# Patient Record
Sex: FEMALE | Race: WHITE | NOT HISPANIC OR LATINO | Employment: UNEMPLOYED | URBAN - METROPOLITAN AREA
[De-identification: names, ages, dates, MRNs, and addresses within clinical notes are randomized per-mention and may not be internally consistent; named-entity substitution may affect disease eponyms.]

---

## 2021-07-17 ENCOUNTER — OFFICE VISIT (OUTPATIENT)
Dept: URGENT CARE | Age: 21
End: 2021-07-17
Payer: COMMERCIAL

## 2021-07-17 VITALS
RESPIRATION RATE: 20 BRPM | TEMPERATURE: 98.4 F | HEART RATE: 90 BPM | OXYGEN SATURATION: 100 % | SYSTOLIC BLOOD PRESSURE: 128 MMHG | DIASTOLIC BLOOD PRESSURE: 77 MMHG

## 2021-07-17 DIAGNOSIS — R10.9 FLANK PAIN: Primary | ICD-10-CM

## 2021-07-17 LAB
SL AMB  POCT GLUCOSE, UA: NEGATIVE
SL AMB LEUKOCYTE ESTERASE,UA: NEGATIVE
SL AMB POCT BILIRUBIN,UA: NEGATIVE
SL AMB POCT BLOOD,UA: ABNORMAL
SL AMB POCT CLARITY,UA: ABNORMAL
SL AMB POCT COLOR,UA: YELLOW
SL AMB POCT KETONES,UA: 80
SL AMB POCT NITRITE,UA: NEGATIVE
SL AMB POCT PH,UA: 5
SL AMB POCT SPECIFIC GRAVITY,UA: 1.03
SL AMB POCT URINE PROTEIN: 30
SL AMB POCT UROBILINOGEN: 0.2

## 2021-07-17 PROCEDURE — 87086 URINE CULTURE/COLONY COUNT: CPT | Performed by: NURSE PRACTITIONER

## 2021-07-17 PROCEDURE — 99283 EMERGENCY DEPT VISIT LOW MDM: CPT | Performed by: NURSE PRACTITIONER

## 2021-07-17 PROCEDURE — G0382 LEV 3 HOSP TYPE B ED VISIT: HCPCS | Performed by: NURSE PRACTITIONER

## 2021-07-17 PROCEDURE — 81002 URINALYSIS NONAUTO W/O SCOPE: CPT | Performed by: NURSE PRACTITIONER

## 2021-07-17 RX ORDER — TACROLIMUS 1 MG/1
2 CAPSULE ORAL
COMMUNITY
Start: 2021-05-12

## 2021-07-17 NOTE — PROGRESS NOTES
[facility]        NAME: Jaclyn Hopson is a 24 y o  female  : 2000    MRN: [id number]  DATE: 2021  TIME: 6:27 PM    Assessment and Plan   Flank pain [R10 9]  1  Flank pain  POCT urine dip    Urine culture         Patient Instructions     Urine dip is positive for blood  Advised to go to the ED for further eval, to r/o kidney stones or other causes    Chief Complaint     Chief Complaint   Patient presents with    Abdominal Pain     pt states started with pain in right flank this am, pain radiates around to abdomen toward groin, called transplant doctor and was told to r/o a UTI         History of Present Illness       HPI    presents to clinic with complaint of abdominal pain  States it started this morning  Sometimes the pain radiates from the right flank today front of the abdomen down into the groin  Other times radiates from around the umbilicus and goes to the right flank  She has a history of  Liver transplant  No fever  No nausea or vomiting  Review of Systems   Review of Systems   Constitutional: Negative for chills and fever  HENT: Negative for sore throat and trouble swallowing  Respiratory: Negative for cough, shortness of breath and wheezing  Cardiovascular: Negative for chest pain  Gastrointestinal: Positive for abdominal pain  Negative for blood in stool, diarrhea, nausea and vomiting  Genitourinary: Negative for difficulty urinating and frequency  Neurological: Negative for dizziness and headaches           Current Medications       Current Outpatient Medications:     tacrolimus (PROGRAF) 1 mg capsule, Take 2 mg by mouth, Disp: , Rfl:     Current Allergies     Allergies as of 2021    (No Known Allergies)            The following portions of the patient's history were reviewed and updated as appropriate: allergies, current medications, past family history, past medical history, past social history, past surgical history and problem list  History reviewed  No pertinent past medical history  Past Surgical History:   Procedure Laterality Date    LIVER SURGERY      transplant       History reviewed  No pertinent family history  Medications have been verified  Objective   /77   Pulse 90   Temp 98 4 °F (36 9 °C)   Resp 20   LMP 06/23/2021 (Exact Date)   SpO2 100%   Patient's last menstrual period was 06/23/2021 (exact date)  Physical Exam     Physical Exam  Abdominal:      Tenderness: There is abdominal tenderness in the periumbilical area  There is no guarding  Negative signs include Barrera's sign  Hernia: There is no hernia in the umbilical area or ventral area  Neurological:      Mental Status: She is alert

## 2021-07-19 LAB — BACTERIA UR CULT: NORMAL

## 2022-09-06 ENCOUNTER — TELEPHONE (OUTPATIENT)
Dept: GASTROENTEROLOGY | Facility: CLINIC | Age: 22
End: 2022-09-06

## 2022-09-06 NOTE — TELEPHONE ENCOUNTER
Called patient re her missed 9/6 appointment with Dr Sarahi MAYSM to call so we can reschedule her appointment  Also LVM for her mother, Aparna Chaudhari, to call and reschedule daughter's appointment

## 2022-09-16 ENCOUNTER — TELEPHONE (OUTPATIENT)
Dept: GASTROENTEROLOGY | Facility: CLINIC | Age: 22
End: 2022-09-16

## 2022-09-16 NOTE — TELEPHONE ENCOUNTER
Called patient, spoke to her mother, Denise Wray  They got lost coming to the ov   They rescheduled appointment to 10/11 at 1:00 PM at [address]

## 2022-10-11 ENCOUNTER — OFFICE VISIT (OUTPATIENT)
Dept: GASTROENTEROLOGY | Facility: CLINIC | Age: 22
End: 2022-10-11
Payer: COMMERCIAL

## 2022-10-11 VITALS
SYSTOLIC BLOOD PRESSURE: 122 MMHG | DIASTOLIC BLOOD PRESSURE: 88 MMHG | HEART RATE: 84 BPM | TEMPERATURE: 98.7 F | HEIGHT: 60 IN | BODY MASS INDEX: 25.32 KG/M2 | OXYGEN SATURATION: 99 % | WEIGHT: 129 LBS

## 2022-10-11 DIAGNOSIS — Z94.4 S/P LIVER TRANSPLANT (HCC): Primary | ICD-10-CM

## 2022-10-11 DIAGNOSIS — Z00.00 ROUTINE HEALTH MAINTENANCE: ICD-10-CM

## 2022-10-11 DIAGNOSIS — Z91.89 AT RISK FOR OSTEOPOROSIS: ICD-10-CM

## 2022-10-11 PROCEDURE — 99214 OFFICE O/P EST MOD 30 MIN: CPT | Performed by: STUDENT IN AN ORGANIZED HEALTH CARE EDUCATION/TRAINING PROGRAM

## 2022-10-11 RX ORDER — CLOBETASOL PROPIONATE 0.5 MG/G
OINTMENT TOPICAL
COMMUNITY
Start: 2022-09-20

## 2022-10-11 RX ORDER — CALCIPOTRIENE 0.05 MG/ML
SOLUTION TOPICAL
COMMUNITY
Start: 2022-09-20

## 2022-10-11 NOTE — PROGRESS NOTES
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) with initial post-operative course c/b PVT, hyperammonemia and poor growth  She had a mesocaval shunt performed after her first transplant but required retransplant in 2005 with Rosales-en-Y anastomosis for hyperammonemia and progressive malnutrition  Course has been complicated by hypertension and mild ACR which did not require IV steroids  Of note, her last liver biopsy was in in August 2019 which did not show rejection or fibrosis  She reports intermittent RUQ pain due to adhesive disease  She does have a history of abdominal migraines but has had any symptoms for several years  She is in her third year of college  She denies excessive EtOH consumption or tobacco abuse  Current immunosuppression: tacrolius 2 mg BID; has trialed rapamune in the past     Immunology/Viral Surveillance: CMV R-/D-, EBV R-/ D+ (#1); CMV R-/D-, EBV R-/D- (#2); CMV last positive in 2005, CMV and EBV have been negative  Complication History:  Rejection: Y  Biliary: N  Hepatic Artery: N  Infections: Y  Metabolic Complications:  Immunosuppression Side Effects: N    Review of Systems  Her review of systems is otherwise negative today  She denies bouts of fever, malaise, sweats, headaches, chest pain, dyspnea, cough, weight change, anorexia, nausea, vomiting, abdominal pain, change in bowel movements, gastrointestinal bleeding, jaundice, rash, pruritus, tremors    I have reviewed the past medical, social, family history and medication / allergy list in the chart today  I have made appropriate updates to EMR where indicated      Medications:  Current Outpatient Medications on File Prior to Visit   Medication Sig Dispense Refill   • calcipotriene (DOVONEX) 0 005 % topical solution Apply 1-2 drops to psoriasis areas nightly as needed  • clobetasol (TEMOVATE) 0 05 % ointment apply A THN LAYER TO PINK SCALY PATCHES TWICE DAILY FOR 2 TO 7 DA     (REFER TO PRESCRIPTION NOTES)  • tacrolimus (PROGRAF) 1 mg capsule Take 2 mg by mouth       No current facility-administered medications on file prior to visit  Physical Exam  /88 (BP Location: Left arm, Patient Position: Sitting, Cuff Size: Standard)   Pulse 84   Temp 98 7 °F (37 1 °C) (Tympanic)   Ht 5' (1 524 m)   Wt 58 5 kg (129 lb)   SpO2 99%   BMI 25 19 kg/m²   General: Well-appearing  HEENT: No scleral icterus  Normal oral mucosa  CV: Regular rate and rhythm  Respiratory: Breathing comfortably on room air  GI: Abdomen soft, non-tender, non-distended  No hepatosplenomegaly or ascites  Extremities: Warm, well-perfused  No clubbing, cyanosis, or edema  Skin: No rashes, lesions, or stigmata of chronic liver disease  Psych: Normal mood and affect  Neuro: Alert and oriented, answers questions appropriately  Labs and Imaging  6 6 > 13 9 < 261  140/3 9/103/28/10/0 5<89  Tb 0 8, Db 0 3  AST 17, ALT 17, , GGT 10  Tacrolimus 5 2     Liver biopsy (surveillance 8 5yrs) - 4/18/2014  Focal, (one portal tract) portal inflammation   MARIEL HARSH is negative  --- No evidence of bile duct destruction, cholestasis or bile duct proliferation confirmed with CK7 immunohistochemical stain  --- Scattered, Zone 1, hepatocellular nuclear glycogen  --- No evidence of hepatocellular necrosis or lobulitis  Final Diagnosis, Comment, and Descriptions as rendered by Orquidea Eid MD, [facility], [facility]  AND [facility] Department of Pathology (see also original [facility] Consultation report [id number]): DIAGNOSIS: LIVER TRANSPLANT (BIOPSY; [address] [id number]): UNREMARKABLE HEPATIC PARENCHYMA   NO EVIDENCE OF ACUTE OR CHRONIC REJECTION  Clinical History: S/P LIVER TRANSPLANT X2 (MOST RECENT 2005) FOR PORTAL VEIN THROMBOSIS      Liver biopsy - 8/8/2019 (14 year surveillance)  Final Pathologic Diagnosis   Allograft liver, needle biopsy:   -     Focal non-specific chronic portal inflammation involving few portal areas  -     Trichrome stain shows no significant fibrosis  Diagnosis Comment   The histologic changes are similar to those present in the previous biopsy specimen ([id number]), which was also reviewed  There is no definitive evidence of T-cell mediated rejection  There is no increased fibrosis on trichrome stain  Assessment / Plan / Discussion:  22year-old female with history of BA s/p OLTx 2 (2002 and 2005) with initial post-operative course c/b PVT, hyperammonemia and poor growth requiring re-transplant with Lary anastomosis  Course has been complicated by HTN and mild ACR which have resolved  Her last liver biopsy was in August 2019 which did not show rejection or fibrosis  Her labs were reviewed which showed excellent graft function  Active issues discussed today included immunosuppression monitoring and routine health-care maintenance post-liver transplant  • Immunosuppression  - Continue tacrolimus 2 mg BID  I have recommended laboratories every 3 months  in order to monitor immunosuppression efficacy and toxicities  Standing lab orders placed by me will be updated based on continually review of those lab results  • Renal function  - Wean tacrolimus as able  - Avoid nephrotoxic agents     • Skin Cancer Surveillance  - I discussed the increased risk of de gala malignancy post liver transplant including risk of skin cancer development  Thus, I have recommended a dermatology evaluation once yearly as well as daily use of sunscreen (SPF 25+) and protective clothing      • PAP Surveillance  - Due for PAP for cervical cancer surveillance     • Bone Density Surveillance  - Ordered BMD and recommend vitamin D/calcium supplementation    • Primary Care Monitoring  Dyslipidemia:  Lipid panel ordered  Hypertension: normotensive  Diabetes: A1c ordered   Weight management:   Wt Readings from Last 3 Encounters:   10/11/22 58 5 kg (129 lb)     I have recommended that the she  follow-up with primary care physician on a regular basis for management of other medical issues and preventive care  • Vaccinations  Beyond post-transplant month six, I recommend remaining current with influenza, pneumococcus, and Td/DTaP vaccines  Live vaccinations (for example Varicella) are contraindicated and should NOT be administered yet Shingrix is acceptable for a post transplant recipient       CLINICAL INFORMATION:  Immunosuppression: compliant   New signs or symptoms of CAD?: N  Clinical events related to CAD: N  Post-transplant diabetes: N  Graft Functioning: Yes  Acute Rejection:  N  Post-Transplant Malignancy: N    PHYSICAL STATUS:  Karnofsky Score: 100%  Physical Capacity: no restrictions   Working for Income: student     Return to clinic:  1 year     Stephanie Barrera  Division of Gastroenterology and Hepatology  [address] [address]

## 2022-10-11 NOTE — PATIENT INSTRUCTIONS
It was great meeting you today  Here is a summary of our recommendations from today's visit  Repeat labs this month   Schedule dermatology appointment  Schedule PAP smear with PCP    Pt will schedule Dexa scan

## 2022-11-06 ENCOUNTER — OFFICE VISIT (OUTPATIENT)
Dept: URGENT CARE | Facility: MEDICAL CENTER | Age: 22
End: 2022-11-06

## 2022-11-06 VITALS
TEMPERATURE: 98 F | SYSTOLIC BLOOD PRESSURE: 134 MMHG | HEIGHT: 60 IN | HEART RATE: 100 BPM | RESPIRATION RATE: 18 BRPM | OXYGEN SATURATION: 99 % | BODY MASS INDEX: 25.32 KG/M2 | WEIGHT: 129 LBS | DIASTOLIC BLOOD PRESSURE: 67 MMHG

## 2022-11-06 DIAGNOSIS — R68.83 CHILLS: ICD-10-CM

## 2022-11-06 DIAGNOSIS — R11.0 NAUSEA: ICD-10-CM

## 2022-11-06 DIAGNOSIS — R10.9 ABDOMINAL PAIN, UNSPECIFIED ABDOMINAL LOCATION: Primary | ICD-10-CM

## 2022-11-06 LAB
SL AMB  POCT GLUCOSE, UA: ABNORMAL
SL AMB LEUKOCYTE ESTERASE,UA: ABNORMAL
SL AMB POCT BILIRUBIN,UA: ABNORMAL
SL AMB POCT BLOOD,UA: ABNORMAL
SL AMB POCT CLARITY,UA: ABNORMAL
SL AMB POCT COLOR,UA: ABNORMAL
SL AMB POCT KETONES,UA: ABNORMAL
SL AMB POCT NITRITE,UA: ABNORMAL
SL AMB POCT PH,UA: 7.5
SL AMB POCT SPECIFIC GRAVITY,UA: 1.01
SL AMB POCT URINE HCG: NEGATIVE
SL AMB POCT URINE PROTEIN: ABNORMAL
SL AMB POCT UROBILINOGEN: 0.2

## 2022-11-06 RX ORDER — ONDANSETRON 4 MG/1
4 TABLET, ORALLY DISINTEGRATING ORAL EVERY 6 HOURS PRN
Qty: 20 TABLET | Refills: 0 | Status: SHIPPED | OUTPATIENT
Start: 2022-11-06

## 2022-11-06 NOTE — PATIENT INSTRUCTIONS
1  Over-the-counter acetaminophen 975 mg every 6-8 hours as needed for pain or fever  2  Observe a liquid diet until symptoms improve  3  Increase oral fluids  4  Go to the ER immediately for any worsening symptoms or any symptoms that are not improving within 24 hours

## 2022-11-06 NOTE — PROGRESS NOTES
[facility]        NAME: Toñito Cabrera is a 22 y o  female  : 2000    MRN: [id number]  DATE: 2022  TIME: 1:01 PM    Assessment and Plan   Abdominal pain, unspecified abdominal location [R10 9]  1  Abdominal pain, unspecified abdominal location  POCT urine dip    POCT urine HCG    ondansetron (Zofran ODT) 4 mg disintegrating tablet   2  Nausea     3  Chills           Patient Instructions     1  Over-the-counter acetaminophen 975 mg every 6-8 hours as needed for pain or fever  2  Observe a liquid diet until symptoms improve  3  Increase oral fluids  4  Go to the ER immediately for any worsening symptoms or any symptoms that are not improving within 24 hours  Chief Complaint     Chief Complaint   Patient presents with   • Abdominal Pain     Patient states that she has intermittent abdominal pain that wraps around to b/l lower; associated with nausea     Patient also states she has had intermittent headaches          History of Present Illness       61-year-old female patient with a history of liver transplant in the distant past complaining of 4 day history of waxing and waning mid abdominal pain, chills, nausea, loose stool  Patient denies any bloody vomitus or vomiting at all to date  Denies any blood in the stool  Denies any urinary symptoms  She states that she currently does have her period  No respiratory symptoms  Has been able to eat but does have slight decrease in appetite  Pain does not radiate and she indicates the main discomfort in the mid abdomen  Review of Systems   Review of Systems   Constitutional: Positive for chills and fatigue  HENT: Negative for ear pain and sore throat  Eyes: Negative for pain and visual disturbance  Respiratory: Negative for cough and shortness of breath  Cardiovascular: Negative for chest pain and palpitations  Gastrointestinal: Positive for abdominal pain, diarrhea and nausea  Negative for vomiting  Genitourinary: Negative for dysuria and hematuria  Musculoskeletal: Negative for arthralgias and back pain  Skin: Negative for color change and rash  Neurological: Negative for seizures and syncope  All other systems reviewed and are negative  Current Medications       Current Outpatient Medications:   •  ondansetron (Zofran ODT) 4 mg disintegrating tablet, Take 1 tablet (4 mg total) by mouth every 6 (six) hours as needed for nausea or vomiting, Disp: 20 tablet, Rfl: 0  •  calcipotriene (DOVONEX) 0 005 % topical solution, Apply 1-2 drops to psoriasis areas nightly as needed  , Disp: , Rfl:   •  clobetasol (TEMOVATE) 0 05 % ointment, apply A THN LAYER TO PINK SCALY PATCHES TWICE DAILY FOR 2 TO 7 DA     (REFER TO PRESCRIPTION NOTES)  , Disp: , Rfl:   •  tacrolimus (PROGRAF) 1 mg capsule, Take 2 mg by mouth, Disp: , Rfl:     Current Allergies     Allergies as of 11/06/2022   • (No Known Allergies)            The following portions of the patient's history were reviewed and updated as appropriate: allergies, current medications, past family history, past medical history, past social history, past surgical history and problem list      History reviewed  No pertinent past medical history  Past Surgical History:   Procedure Laterality Date   • LIVER SURGERY      transplant       History reviewed  No pertinent family history  Medications have been verified  Objective   /67   Pulse 100   Temp 98 °F (36 7 °C) (Temporal)   Resp 18   Ht 5' (1 524 m)   Wt 58 5 kg (129 lb)   SpO2 99%   BMI 25 19 kg/m²        Physical Exam     Physical Exam  Constitutional:       General: She is not in acute distress  Appearance: Normal appearance  She is not ill-appearing  HENT:      Head: Normocephalic  Nose: Nose normal    Eyes:      Extraocular Movements: Extraocular movements intact  Conjunctiva/sclera: Conjunctivae normal       Pupils: Pupils are equal, round, and reactive to light  Cardiovascular:      Rate and Rhythm: Normal rate and regular rhythm  Pulmonary:      Effort: Pulmonary effort is normal       Breath sounds: Normal breath sounds  Abdominal:      General: Bowel sounds are normal       Palpations: Abdomen is soft  Tenderness: There is abdominal tenderness in the periumbilical area  There is no right CVA tenderness, left CVA tenderness, guarding or rebound  Hernia: No hernia is present  Musculoskeletal:      Cervical back: Normal range of motion  Skin:     General: Skin is warm and dry  Capillary Refill: Capillary refill takes less than 2 seconds  Neurological:      General: No focal deficit present  Mental Status: She is alert and oriented to person, place, and time     Psychiatric:         Mood and Affect: Mood normal          Behavior: Behavior normal

## 2022-11-07 ENCOUNTER — HOSPITAL ENCOUNTER (EMERGENCY)
Facility: HOSPITAL | Age: 22
Discharge: HOME | End: 2022-11-07
Attending: EMERGENCY MEDICINE

## 2022-11-07 ENCOUNTER — APPOINTMENT (EMERGENCY)
Dept: CT IMAGING | Facility: HOSPITAL | Age: 22
End: 2022-11-07

## 2022-11-07 VITALS
RESPIRATION RATE: 18 BRPM | BODY MASS INDEX: 25.15 KG/M2 | DIASTOLIC BLOOD PRESSURE: 78 MMHG | HEIGHT: 60 IN | HEART RATE: 90 BPM | TEMPERATURE: 98 F | WEIGHT: 128.09 LBS | SYSTOLIC BLOOD PRESSURE: 122 MMHG | OXYGEN SATURATION: 99 %

## 2022-11-07 DIAGNOSIS — R10.84 GENERALIZED ABDOMINAL PAIN: Primary | ICD-10-CM

## 2022-11-07 LAB
ALBUMIN SERPL BCP-MCNC: 4.1 G/DL (ref 3.5–5)
ALP SERPL-CCNC: 94 U/L (ref 46–116)
ALT SERPL W P-5'-P-CCNC: 17 U/L (ref 12–78)
ANION GAP SERPL CALCULATED.3IONS-SCNC: 8 MMOL/L (ref 4–13)
AST SERPL W P-5'-P-CCNC: 10 U/L (ref 5–45)
BACTERIA UR QL AUTO: ABNORMAL /HPF
BASOPHILS # BLD AUTO: 0.03 THOUSANDS/ÂΜL (ref 0–0.1)
BASOPHILS NFR BLD AUTO: 0 % (ref 0–1)
BILIRUB SERPL-MCNC: 1.18 MG/DL (ref 0.2–1)
BILIRUB UR QL STRIP: NEGATIVE
BUN SERPL-MCNC: 15 MG/DL (ref 5–25)
CALCIUM SERPL-MCNC: 9.6 MG/DL (ref 8.3–10.1)
CHLORIDE SERPL-SCNC: 105 MMOL/L (ref 96–108)
CLARITY UR: ABNORMAL
CO2 SERPL-SCNC: 29 MMOL/L (ref 21–32)
COLOR UR: YELLOW
CREAT SERPL-MCNC: 0.83 MG/DL (ref 0.6–1.3)
EOSINOPHIL # BLD AUTO: 0.31 THOUSAND/ÂΜL (ref 0–0.61)
EOSINOPHIL NFR BLD AUTO: 4 % (ref 0–6)
ERYTHROCYTE [DISTWIDTH] IN BLOOD BY AUTOMATED COUNT: 14 % (ref 11.6–15.1)
EXT PREG TEST URINE: NEGATIVE
EXT. CONTROL ED NAV: NORMAL
FLUAV RNA RESP QL NAA+PROBE: NEGATIVE
FLUBV RNA RESP QL NAA+PROBE: NEGATIVE
GFR SERPL CREATININE-BSD FRML MDRD: 101 ML/MIN/1.73SQ M
GLUCOSE SERPL-MCNC: 102 MG/DL (ref 65–140)
GLUCOSE UR STRIP-MCNC: NEGATIVE MG/DL
HCT VFR BLD AUTO: 38.6 % (ref 34.8–46.1)
HGB BLD-MCNC: 13 G/DL (ref 11.5–15.4)
HGB UR QL STRIP.AUTO: ABNORMAL
IMM GRANULOCYTES # BLD AUTO: 0.02 THOUSAND/UL (ref 0–0.2)
IMM GRANULOCYTES NFR BLD AUTO: 0 % (ref 0–2)
KETONES UR STRIP-MCNC: NEGATIVE MG/DL
LEUKOCYTE ESTERASE UR QL STRIP: NEGATIVE
LIPASE SERPL-CCNC: 62 U/L (ref 73–393)
LYMPHOCYTES # BLD AUTO: 2.17 THOUSANDS/ÂΜL (ref 0.6–4.47)
LYMPHOCYTES NFR BLD AUTO: 27 % (ref 14–44)
MCH RBC QN AUTO: 29.7 PG (ref 26.8–34.3)
MCHC RBC AUTO-ENTMCNC: 33.7 G/DL (ref 31.4–37.4)
MCV RBC AUTO: 88 FL (ref 82–98)
MONOCYTES # BLD AUTO: 0.58 THOUSAND/ÂΜL (ref 0.17–1.22)
MONOCYTES NFR BLD AUTO: 7 % (ref 4–12)
MUCOUS THREADS UR QL AUTO: ABNORMAL
NEUTROPHILS # BLD AUTO: 5.04 THOUSANDS/ÂΜL (ref 1.85–7.62)
NEUTS SEG NFR BLD AUTO: 62 % (ref 43–75)
NITRITE UR QL STRIP: NEGATIVE
NON-SQ EPI CELLS URNS QL MICRO: ABNORMAL /HPF
NRBC BLD AUTO-RTO: 0 /100 WBCS
PH UR STRIP.AUTO: 5.5 [PH]
PLATELET # BLD AUTO: 265 THOUSANDS/UL (ref 149–390)
PMV BLD AUTO: 10.4 FL (ref 8.9–12.7)
POTASSIUM SERPL-SCNC: 3.7 MMOL/L (ref 3.5–5.3)
PROT SERPL-MCNC: 8 G/DL (ref 6.4–8.4)
PROT UR STRIP-MCNC: ABNORMAL MG/DL
RBC # BLD AUTO: 4.37 MILLION/UL (ref 3.81–5.12)
RBC #/AREA URNS AUTO: ABNORMAL /HPF
RSV RNA RESP QL NAA+PROBE: NEGATIVE
SARS-COV-2 RNA RESP QL NAA+PROBE: NEGATIVE
SODIUM SERPL-SCNC: 142 MMOL/L (ref 135–147)
SP GR UR STRIP.AUTO: 1.02 (ref 1–1.03)
UROBILINOGEN UR STRIP-ACNC: 2 MG/DL
WBC # BLD AUTO: 8.15 THOUSAND/UL (ref 4.31–10.16)
WBC #/AREA URNS AUTO: ABNORMAL /HPF

## 2022-11-07 RX ORDER — ONDANSETRON 2 MG/ML
4 INJECTION INTRAMUSCULAR; INTRAVENOUS ONCE
Status: COMPLETED | OUTPATIENT
Start: 2022-11-07 | End: 2022-11-07

## 2022-11-07 RX ADMIN — SODIUM CHLORIDE 1000 ML: 0.9 INJECTION, SOLUTION INTRAVENOUS at 20:40

## 2022-11-07 RX ADMIN — ONDANSETRON 4 MG: 2 INJECTION INTRAMUSCULAR; INTRAVENOUS at 20:40

## 2022-11-07 RX ADMIN — IOHEXOL 100 ML: 350 INJECTION, SOLUTION INTRAVENOUS at 20:47

## 2022-11-08 NOTE — ED PROVIDER NOTES
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, last was in 2005  Has been well managed since then  )    Relieved by:  None tried  Worsened by:  Nothing  Ineffective treatments:  None tried  Associated symptoms: chills, diarrhea (few loose watery stools), fatigue, fever (subjective), nausea and vaginal bleeding (on her menses)    Associated symptoms: no anorexia, no chest pain, no cough, no shortness of breath, no sore throat and no vomiting    Risk factors: no NSAID use and not pregnant    Risk factors comment: On immunosuppressant for liver transplant, stable on them      Prior to Admission Medications   Prescriptions Last Dose Informant Patient Reported? Taking?   calcipotriene (DOVONEX) 0 005 % topical solution   Yes No   Sig: Apply 1-2 drops to psoriasis areas nightly as needed  clobetasol (TEMOVATE) 0 05 % ointment   Yes No   Sig: apply A THN LAYER TO PINK SCALY PATCHES TWICE DAILY FOR 2 TO 7 DA     (REFER TO PRESCRIPTION NOTES)     ondansetron (Zofran ODT) 4 mg disintegrating tablet   No No   Sig: Take 1 tablet (4 mg total) by mouth every 6 (six) hours as needed for nausea or vomiting   tacrolimus (PROGRAF) 1 mg capsule   Yes No   Sig: Take 2 mg by mouth      Facility-Administered Medications: None       Past Medical History:   Diagnosis Date   • Biliary atresia    • Migraines    • Portal vein thrombosis        Past Surgical History:   Procedure Laterality Date   • HEPATITIS B SURFACE AB QN,LIVER TRANSPLANT (HISTORICAL)     • LIVER SURGERY      transplant       History reviewed  No pertinent family history  I have reviewed and agree with the history as documented  E-Cigarette/Vaping   • E-Cigarette Use Never User      E-Cigarette/Vaping Substances     Social History     Tobacco Use   • Smoking status: Never Smoker   • Smokeless tobacco: Never Used   Vaping Use   • Vaping Use: Never used   Substance Use Topics   • Alcohol use: Never   • Drug use: Never       Review of Systems   Constitutional: Positive for chills, fatigue and fever (subjective)  HENT: Negative for sore throat  Respiratory: Negative for cough and shortness of breath  Cardiovascular: Negative for chest pain  Gastrointestinal: Positive for abdominal pain, diarrhea (few loose watery stools) and nausea  Negative for anorexia and vomiting  Genitourinary: Positive for vaginal bleeding (on her menses)  All other systems reviewed and are negative  Physical Exam  Physical Exam  Vitals and nursing note reviewed  Constitutional:       General: She is not in acute distress  Appearance: She is well-developed  She is not diaphoretic  HENT:      Head: Normocephalic and atraumatic  Nose: Nose normal    Eyes:      Conjunctiva/sclera: Conjunctivae normal    Cardiovascular:      Rate and Rhythm: Normal rate and regular rhythm  Heart sounds: Normal heart sounds  Pulmonary:      Effort: Pulmonary effort is normal  No respiratory distress  Breath sounds: Normal breath sounds  Abdominal:      Palpations: Abdomen is soft  Tenderness: There is generalized abdominal tenderness  Comments: Well-healed large abd scars   Musculoskeletal:         General: Normal range of motion  Cervical back: Normal range of motion and neck supple  Skin:     General: Skin is warm and dry  Neurological:      Mental Status: She is alert and oriented to person, place, and time           Vital Signs  ED Triage Vitals [11/07/22 1843]   Temperature Pulse Respirations Blood Pressure SpO2   98 °F (36 7 °C) 92 20 134/82 100 %      Temp Source Heart Rate Source Patient Position - Orthostatic VS BP Location FiO2 (%)   Oral Monitor Sitting Left arm --      Pain Score       --           Vitals:    11/07/22 1843 11/07/22 2201   BP: 134/82 122/78   Pulse: 92 90   Patient Position - Orthostatic VS: Sitting Lying         Visual Acuity      ED Medications  Medications   sodium chloride 0 9 % bolus 1,000 mL (0 mL Intravenous Stopped 11/7/22 2256)   ondansetron (ZOFRAN) injection 4 mg (4 mg Intravenous Given 11/7/22 2040)   iohexol (OMNIPAQUE) 350 MG/ML injection (SINGLE-DOSE) 100 mL (100 mL Intravenous Given 11/7/22 2047)       Diagnostic Studies  Results Reviewed     Procedure Component Value Units Date/Time    FLU/RSV/COVID - if FLU/RSV clinically relevant [[id number]]  (Normal) Collected: 11/07/22 2111    Lab Status: Final result Specimen: Nares from Nose Updated: 11/07/22 2211     SARS-CoV-2 Negative     INFLUENZA A PCR Negative     INFLUENZA B PCR Negative     RSV PCR Negative    Narrative:      FOR PEDIATRIC PATIENTS - copy/paste COVID Guidelines URL to browser: [email or web address]    SARS-CoV-2 assay is a Nucleic Acid Amplification assay intended for the  qualitative detection of nucleic acid from SARS-CoV-2 in nasopharyngeal  swabs  Results are for the presumptive identification of SARS-CoV-2 RNA  Positive results are indicative of infection with SARS-CoV-2, the virus  causing COVID-19, but do not rule out bacterial infection or co-infection  with other viruses  Laboratories within the [place] and its  territories are required to report all positive results to the appropriate  public health authorities  Negative results do not preclude SARS-CoV-2  infection and should not be used as the sole basis for treatment or other  patient management decisions  Negative results must be combined with  clinical observations, patient history, and epidemiological information  This test has not been FDA cleared or approved  This test has been authorized by FDA under an Emergency Use Authorization  (EUA)  This test is only authorized for the duration of time the  declaration that circumstances exist justifying the authorization of the  emergency use of an in vitro diagnostic tests for detection of SARS-CoV-2  virus and/or diagnosis of COVID-19 infection under section 564(b)(1) of  the Act, 21 U  S C  104EZL-3(U)(3), unless the authorization is terminated  or revoked sooner  The test has been validated but independent review by FDA  and CLIA is pending  Test performed using [employer] Gene[employer]pert: This RT-PCR assay targets N2,  a region unique to SARS-CoV-2  A conserved region in the E-gene was chosen  for pan-Sarbecovirus detection which includes SARS-CoV-2  According to CMS-2020-01-R, this platform meets the definition of high-throughput technology      Urine Microscopic [[id number]]  (Abnormal) Collected: 11/2001    Lab Status: Final result Specimen: Urine, Clean Catch Updated: 11/07/22 2014     RBC, UA Innumerable /hpf      WBC, UA 1-2 /hpf      Epithelial Cells Occasional /hpf      Bacteria, UA Occasional /hpf      MUCUS THREADS Occasional    UA w Reflex to Microscopic w Reflex to Culture [[id number]]  (Abnormal) Collected: 11/2001    Lab Status: Final result Specimen: Urine, Clean Catch Updated: 11/07/22 2012     Color, UA Yellow     Clarity, UA Turbid     Specific Gravity, UA 1 025     pH, UA 5 5     Leukocytes, UA Negative     Nitrite, UA Negative     Protein, UA Trace mg/dl      Glucose, UA Negative mg/dl      Ketones, UA Negative mg/dl      Urobilinogen, UA 2 0 mg/dl      Bilirubin, UA Negative     Occult Blood, UA Large    POCT pregnancy, urine [[id number]]  (Normal) Resulted: 11/07/22 2005    Lab Status: Final result Updated: 11/07/22 2005     EXT PREG TEST UR (Ref: Negative) negative     Control valid    Comprehensive metabolic panel [[id number]]  (Abnormal) Collected: 11/07/22 1848    Lab Status: Final result Specimen: Blood from Arm, Right Updated: 11/07/22 1909     Sodium 142 mmol/L      Potassium 3 7 mmol/L      Chloride 105 mmol/L      CO2 29 mmol/L      ANION GAP 8 mmol/L      BUN 15 mg/dL      Creatinine 0 83 mg/dL      Glucose 102 mg/dL      Calcium 9 6 mg/dL      AST 10 U/L      ALT 17 U/L      Alkaline Phosphatase 94 U/L      Total Protein 8 0 g/dL      Albumin 4 1 g/dL      Total Bilirubin 1 18 mg/dL      eGFR 101 ml/min/1 73sq m     Narrative:      National Kidney Disease Foundation guidelines for Chronic Kidney Disease (CKD):   •  Stage 1 with normal or high GFR (GFR > 90 mL/min/1 73 square meters)  •  Stage 2 Mild CKD (GFR = 60-89 mL/min/1 73 square meters)  •  Stage 3A Moderate CKD (GFR = 45-59 mL/min/1 73 square meters)  •  Stage 3B Moderate CKD (GFR = 30-44 mL/min/1 73 square meters)  •  Stage 4 Severe CKD (GFR = 15-29 mL/min/1 73 square meters)  •  Stage 5 End Stage CKD (GFR <15 mL/min/1 73 square meters)  Note: GFR calculation is accurate only with a steady state creatinine    Lipase [[id number]]  (Abnormal) Collected: 11/07/22 1848    Lab Status: Final result Specimen: Blood from Arm, Right Updated: 11/07/22 1909     Lipase 62 u/L     CBC and differential [[id number]] Collected: 11/07/22 1848    Lab Status: Final result Specimen: Blood from Arm, Right Updated: 11/07/22 1852     WBC 8 15 Thousand/uL      RBC 4 37 Million/uL      Hemoglobin 13 0 g/dL      Hematocrit 38 6 %      MCV 88 fL      MCH 29 7 pg      MCHC 33 7 g/dL      RDW 14 0 %      MPV 10 4 fL      Platelets 678 Thousands/uL      nRBC 0 /100 WBCs      Neutrophils Relative 62 %      Immat GRANS % 0 %      Lymphocytes Relative 27 %      Monocytes Relative 7 %      Eosinophils Relative 4 %      Basophils Relative 0 %      Neutrophils Absolute 5 04 Thousands/µL      Immature Grans Absolute 0 02 Thousand/uL      Lymphocytes Absolute 2 17 Thousands/µL      Monocytes Absolute 0 58 Thousand/µL      Eosinophils Absolute 0 31 Thousand/µL      Basophils Absolute 0 03 Thousands/µL                  CT abdomen pelvis with contrast   Final Result by Katelynn Valle MD (11/07 2127)      No acute inflammatory process identified in the abdomen or pelvis  Workstation performed: [id number]                    Procedures  Procedures         ED Course                                             MDM  Number of Diagnoses or Management Options  Generalized abdominal pain: new and requires workup     Amount and/or Complexity of Data Reviewed  Clinical lab tests: ordered and reviewed  Tests in the radiology section of CPT®: ordered and reviewed    Risk of Complications, Morbidity, and/or Mortality  Presenting problems: high  Management options: high    Patient Progress  Patient progress: stable (D/w her labs and CT scan results  Feels improved with fluids and results  Wants to gets checked out with her liver history and immunosuppressants  D/w her any worsened sx or fever to return to the Ed for repeat eval  )      Disposition  Final diagnoses:   Generalized abdominal pain     Time reflects when diagnosis was documented in both MDM as applicable and the Disposition within this note     Time User Action Codes Description Comment    11/7/2022 10:31 PM Manual Eisenmenger Add [R10 84] Generalized abdominal pain       ED Disposition     ED Disposition   Discharge    Condition   Stable    Date/Time   Mon Nov 7, 2022 10:21 PM    Lynette 61 discharge to home/self care                 Follow-up Information     Follow up With Specialties Details Why [address] [facility] Go to  If symptoms worsen [address]  [phone]            Discharge Medication List as of 11/7/2022 10:32 PM      CONTINUE these medications which have NOT CHANGED    Details calcipotriene (DOVONEX) 0 005 % topical solution Apply 1-2 drops to psoriasis areas nightly as needed , Historical Med      clobetasol (TEMOVATE) 0 05 % ointment apply A THN LAYER TO PINK SCALY PATCHES TWICE DAILY FOR 2 TO 7 DA     (REFER TO PRESCRIPTION NOTES)  , Historical Med      ondansetron (Zofran ODT) 4 mg disintegrating tablet Take 1 tablet (4 mg total) by mouth every 6 (six) hours as needed for nausea or vomiting, Starting Sun 11/6/2022, Normal      tacrolimus (PROGRAF) 1 mg capsule Take 2 mg by mouth, Starting Wed 5/12/2021, Historical Med             No discharge procedures on file      PDMP Review     None          ED Provider  Electronically Signed by           Leta Coughlin MD  11/07/22 1664

## 2023-06-06 NOTE — PROGRESS NOTES
A/P    1  Annual exam    Last PAP never   Next due - today     Scheduling of pap discussed in detail     2  Irregular cycle   Could be q 2 months   When comes only comes for 5 days   Offered things like OCP   She at this time is not interested  If decided will need something that does not have liver clearance is a liver transplant pt        22 y o ,No LMP recorded  C/O irregular cycles       Past medical / social / surgical / family history reviewed and updated   Medication and allergies discussed in detail and updated     Review of Systems - Negative except irregular cycle       Physical Exam  Vitals reviewed  Constitutional:       Appearance: She is well-developed  Neck:      Thyroid: No thyromegaly  Cardiovascular:      Rate and Rhythm: Normal rate  Heart sounds: Normal heart sounds  Pulmonary:      Effort: Pulmonary effort is normal  No accessory muscle usage or respiratory distress  Chest:      Chest wall: No tenderness  Breasts:     Breasts are symmetrical       Right: No inverted nipple, mass or tenderness  Left: No inverted nipple, mass or tenderness  Abdominal:      General: There is no distension  Palpations: Abdomen is soft  Tenderness: There is no abdominal tenderness  There is no guarding or rebound  Genitourinary:     Exam position: Supine  Labia:         Right: No rash, tenderness, lesion or injury  Left: No rash, tenderness, lesion or injury  Vagina: Normal  No foreign body  No vaginal discharge or bleeding  Cervix: No cervical motion tenderness or discharge  Uterus: Not enlarged and not fixed  Adnexa:         Right: No mass, tenderness or fullness  Left: No mass, tenderness or fullness  Rectum: No external hemorrhoid  Musculoskeletal:      Cervical back: Normal range of motion  Lymphadenopathy:      Cervical: No cervical adenopathy        Upper Body:      Right upper body: No supraclavicular adenopathy  Left upper body: No supraclavicular adenopathy  Neurological:      Mental Status: She is alert and oriented to person, place, and time  Psychiatric:         Speech: Speech normal          Behavior: Behavior normal          Thought Content:  Thought content normal          Judgment: Judgment normal

## 2023-06-07 ENCOUNTER — OFFICE VISIT (OUTPATIENT)
Dept: OBGYN CLINIC | Facility: MEDICAL CENTER | Age: 23
End: 2023-06-07
Payer: COMMERCIAL

## 2023-06-07 VITALS — BODY MASS INDEX: 24.58 KG/M2 | HEIGHT: 60 IN | WEIGHT: 125.2 LBS

## 2023-06-07 DIAGNOSIS — Z12.4 ENCOUNTER FOR SCREENING FOR CERVICAL CANCER: Primary | ICD-10-CM

## 2023-06-07 PROCEDURE — G0145 SCR C/V CYTO,THINLAYER,RESCR: HCPCS | Performed by: OBSTETRICS & GYNECOLOGY

## 2023-06-07 PROCEDURE — 99385 PREV VISIT NEW AGE 18-39: CPT | Performed by: OBSTETRICS & GYNECOLOGY

## 2023-06-13 LAB
LAB AP GYN PRIMARY INTERPRETATION: NORMAL
Lab: NORMAL

## 2023-07-14 ENCOUNTER — NURSE TRIAGE (OUTPATIENT)
Age: 23
End: 2023-07-14

## 2023-07-14 NOTE — TELEPHONE ENCOUNTER
----- Message from Anastasiya Dick sent at 7/14/2023 10:39 AM EDT -----  Pt calling stating her psychiatrist order her Lexapro medication and she wants advice on taking this medication because of her liver transplant.

## 2023-07-18 ENCOUNTER — TELEPHONE (OUTPATIENT)
Dept: OTHER | Facility: OTHER | Age: 23
End: 2023-07-18

## 2023-10-29 ENCOUNTER — APPOINTMENT (EMERGENCY)
Dept: CT IMAGING | Facility: HOSPITAL | Age: 23
End: 2023-10-29
Payer: COMMERCIAL

## 2023-10-29 ENCOUNTER — HOSPITAL ENCOUNTER (EMERGENCY)
Facility: HOSPITAL | Age: 23
Discharge: HOME | End: 2023-10-29
Attending: STUDENT IN AN ORGANIZED HEALTH CARE EDUCATION/TRAINING PROGRAM
Payer: COMMERCIAL

## 2023-10-29 VITALS
SYSTOLIC BLOOD PRESSURE: 137 MMHG | WEIGHT: 185 LBS | RESPIRATION RATE: 18 BRPM | OXYGEN SATURATION: 100 % | BODY MASS INDEX: 36.32 KG/M2 | DIASTOLIC BLOOD PRESSURE: 70 MMHG | TEMPERATURE: 98.1 F | HEIGHT: 60 IN | HEART RATE: 94 BPM

## 2023-10-29 DIAGNOSIS — R10.9 ABDOMINAL PAIN: Primary | ICD-10-CM

## 2023-10-29 DIAGNOSIS — K29.70 GASTRITIS: ICD-10-CM

## 2023-10-29 DIAGNOSIS — N39.0 UTI (URINARY TRACT INFECTION): ICD-10-CM

## 2023-10-29 LAB
ALBUMIN SERPL BCP-MCNC: 4.6 G/DL (ref 3.5–5)
ALP SERPL-CCNC: 78 U/L (ref 34–104)
ALT SERPL W P-5'-P-CCNC: 12 U/L (ref 7–52)
ANION GAP SERPL CALCULATED.3IONS-SCNC: 5 MMOL/L
AST SERPL W P-5'-P-CCNC: 16 U/L (ref 13–39)
BACTERIA UR QL AUTO: ABNORMAL /HPF
BASOPHILS # BLD AUTO: 0.04 THOUSANDS/ÂΜL (ref 0–0.1)
BASOPHILS NFR BLD AUTO: 1 % (ref 0–1)
BILIRUB SERPL-MCNC: 1.09 MG/DL (ref 0.2–1)
BILIRUB UR QL STRIP: NEGATIVE
BUDDING YEAST: PRESENT
BUN SERPL-MCNC: 10 MG/DL (ref 5–25)
CALCIUM SERPL-MCNC: 9.7 MG/DL (ref 8.4–10.2)
CHLORIDE SERPL-SCNC: 102 MMOL/L (ref 96–108)
CLARITY UR: ABNORMAL
CO2 SERPL-SCNC: 30 MMOL/L (ref 21–32)
COLOR UR: YELLOW
CREAT SERPL-MCNC: 0.55 MG/DL (ref 0.6–1.3)
EOSINOPHIL # BLD AUTO: 0.29 THOUSAND/ÂΜL (ref 0–0.61)
EOSINOPHIL NFR BLD AUTO: 3 % (ref 0–6)
ERYTHROCYTE [DISTWIDTH] IN BLOOD BY AUTOMATED COUNT: 12.9 % (ref 11.6–15.1)
EXT PREGNANCY TEST URINE: NEGATIVE
EXT. CONTROL: NORMAL
FLUAV RNA RESP QL NAA+PROBE: NEGATIVE
FLUBV RNA RESP QL NAA+PROBE: NEGATIVE
GFR SERPL CREATININE-BSD FRML MDRD: 134 ML/MIN/1.73SQ M
GLUCOSE SERPL-MCNC: 88 MG/DL (ref 65–140)
GLUCOSE UR STRIP-MCNC: NEGATIVE MG/DL
HCT VFR BLD AUTO: 43.2 % (ref 34.8–46.1)
HGB BLD-MCNC: 14.6 G/DL (ref 11.5–15.4)
HGB UR QL STRIP.AUTO: ABNORMAL
IMM GRANULOCYTES # BLD AUTO: 0.01 THOUSAND/UL (ref 0–0.2)
IMM GRANULOCYTES NFR BLD AUTO: 0 % (ref 0–2)
KETONES UR STRIP-MCNC: NEGATIVE MG/DL
LEUKOCYTE ESTERASE UR QL STRIP: ABNORMAL
LIPASE SERPL-CCNC: 12 U/L (ref 11–82)
LYMPHOCYTES # BLD AUTO: 2.41 THOUSANDS/ÂΜL (ref 0.6–4.47)
LYMPHOCYTES NFR BLD AUTO: 28 % (ref 14–44)
MCH RBC QN AUTO: 30.2 PG (ref 26.8–34.3)
MCHC RBC AUTO-ENTMCNC: 33.8 G/DL (ref 31.4–37.4)
MCV RBC AUTO: 89 FL (ref 82–98)
MONOCYTES # BLD AUTO: 0.73 THOUSAND/ÂΜL (ref 0.17–1.22)
MONOCYTES NFR BLD AUTO: 9 % (ref 4–12)
NEUTROPHILS # BLD AUTO: 5.07 THOUSANDS/ÂΜL (ref 1.85–7.62)
NEUTS SEG NFR BLD AUTO: 59 % (ref 43–75)
NITRITE UR QL STRIP: NEGATIVE
NON-SQ EPI CELLS URNS QL MICRO: ABNORMAL /HPF
NRBC BLD AUTO-RTO: 0 /100 WBCS
PH UR STRIP.AUTO: 8 [PH]
PLATELET # BLD AUTO: 289 THOUSANDS/UL (ref 149–390)
PMV BLD AUTO: 9.7 FL (ref 8.9–12.7)
POTASSIUM SERPL-SCNC: 3.9 MMOL/L (ref 3.5–5.3)
PROT SERPL-MCNC: 7.9 G/DL (ref 6.4–8.4)
PROT UR STRIP-MCNC: NEGATIVE MG/DL
RBC # BLD AUTO: 4.84 MILLION/UL (ref 3.81–5.12)
RBC #/AREA URNS AUTO: ABNORMAL /HPF
RSV RNA RESP QL NAA+PROBE: NEGATIVE
SARS-COV-2 RNA RESP QL NAA+PROBE: NEGATIVE
SODIUM SERPL-SCNC: 137 MMOL/L (ref 135–147)
SP GR UR STRIP.AUTO: 1.01 (ref 1–1.03)
UROBILINOGEN UR STRIP-ACNC: <2 MG/DL
WBC # BLD AUTO: 8.55 THOUSAND/UL (ref 4.31–10.16)
WBC #/AREA URNS AUTO: ABNORMAL /HPF

## 2023-10-29 PROCEDURE — 36415 COLL VENOUS BLD VENIPUNCTURE: CPT | Performed by: STUDENT IN AN ORGANIZED HEALTH CARE EDUCATION/TRAINING PROGRAM

## 2023-10-29 PROCEDURE — 96375 TX/PRO/DX INJ NEW DRUG ADDON: CPT

## 2023-10-29 PROCEDURE — 99285 EMERGENCY DEPT VISIT HI MDM: CPT | Performed by: STUDENT IN AN ORGANIZED HEALTH CARE EDUCATION/TRAINING PROGRAM

## 2023-10-29 PROCEDURE — 99284 EMERGENCY DEPT VISIT MOD MDM: CPT

## 2023-10-29 PROCEDURE — 96361 HYDRATE IV INFUSION ADD-ON: CPT

## 2023-10-29 PROCEDURE — G1004 CDSM NDSC: HCPCS

## 2023-10-29 PROCEDURE — 0241U HB NFCT DS VIR RESP RNA 4 TRGT: CPT | Performed by: STUDENT IN AN ORGANIZED HEALTH CARE EDUCATION/TRAINING PROGRAM

## 2023-10-29 PROCEDURE — 83690 ASSAY OF LIPASE: CPT | Performed by: STUDENT IN AN ORGANIZED HEALTH CARE EDUCATION/TRAINING PROGRAM

## 2023-10-29 PROCEDURE — 74177 CT ABD & PELVIS W/CONTRAST: CPT

## 2023-10-29 PROCEDURE — 81001 URINALYSIS AUTO W/SCOPE: CPT | Performed by: STUDENT IN AN ORGANIZED HEALTH CARE EDUCATION/TRAINING PROGRAM

## 2023-10-29 PROCEDURE — 81025 URINE PREGNANCY TEST: CPT | Performed by: STUDENT IN AN ORGANIZED HEALTH CARE EDUCATION/TRAINING PROGRAM

## 2023-10-29 PROCEDURE — 80197 ASSAY OF TACROLIMUS: CPT | Performed by: STUDENT IN AN ORGANIZED HEALTH CARE EDUCATION/TRAINING PROGRAM

## 2023-10-29 PROCEDURE — 96374 THER/PROPH/DIAG INJ IV PUSH: CPT

## 2023-10-29 PROCEDURE — 85025 COMPLETE CBC W/AUTO DIFF WBC: CPT | Performed by: STUDENT IN AN ORGANIZED HEALTH CARE EDUCATION/TRAINING PROGRAM

## 2023-10-29 PROCEDURE — 80053 COMPREHEN METABOLIC PANEL: CPT | Performed by: STUDENT IN AN ORGANIZED HEALTH CARE EDUCATION/TRAINING PROGRAM

## 2023-10-29 RX ORDER — MORPHINE SULFATE 4 MG/ML
4 INJECTION, SOLUTION INTRAMUSCULAR; INTRAVENOUS ONCE
Status: COMPLETED | OUTPATIENT
Start: 2023-10-29 | End: 2023-10-29

## 2023-10-29 RX ORDER — ONDANSETRON 2 MG/ML
4 INJECTION INTRAMUSCULAR; INTRAVENOUS ONCE
Status: COMPLETED | OUTPATIENT
Start: 2023-10-29 | End: 2023-10-29

## 2023-10-29 RX ORDER — CEPHALEXIN 250 MG/1
500 CAPSULE ORAL ONCE
Status: COMPLETED | OUTPATIENT
Start: 2023-10-29 | End: 2023-10-29

## 2023-10-29 RX ORDER — MAGNESIUM HYDROXIDE/ALUMINUM HYDROXICE/SIMETHICONE 120; 1200; 1200 MG/30ML; MG/30ML; MG/30ML
30 SUSPENSION ORAL ONCE
Status: COMPLETED | OUTPATIENT
Start: 2023-10-29 | End: 2023-10-29

## 2023-10-29 RX ORDER — LIDOCAINE HYDROCHLORIDE 20 MG/ML
15 SOLUTION OROPHARYNGEAL 4 TIMES DAILY PRN
Status: DISCONTINUED | OUTPATIENT
Start: 2023-10-29 | End: 2023-10-29 | Stop reason: HOSPADM

## 2023-10-29 RX ADMIN — IOHEXOL 100 ML: 350 INJECTION, SOLUTION INTRAVENOUS at 16:23

## 2023-10-29 RX ADMIN — MORPHINE SULFATE 4 MG: 4 INJECTION INTRAVENOUS at 15:44

## 2023-10-29 RX ADMIN — ALUMINUM HYDROXIDE, MAGNESIUM HYDROXIDE, AND DIMETHICONE 30 ML: 200; 20; 200 SUSPENSION ORAL at 17:34

## 2023-10-29 RX ADMIN — ONDANSETRON 4 MG: 2 INJECTION INTRAMUSCULAR; INTRAVENOUS at 15:43

## 2023-10-29 RX ADMIN — CEPHALEXIN 500 MG: 250 CAPSULE ORAL at 17:34

## 2023-10-29 RX ADMIN — SODIUM CHLORIDE 1000 ML: 0.9 INJECTION, SOLUTION INTRAVENOUS at 15:48

## 2023-10-29 NOTE — DISCHARGE INSTRUCTIONS
Take famotidine 40mg, Once daily for the next few weeks. Monitor what foods may cause increased discomfort. You can keep a food journal.    You have been given a antibiotic for treatment of urinary tract infection. You can take it as prescribed. Follow-up with your gastroenterologist for further evaluation in regards to today's results. Return if any new or worse symptoms such as increasing discomfort, unable to keep foods down or develop any fevers and chills.

## 2023-10-29 NOTE — ED PROVIDER NOTES
History  Chief Complaint   Patient presents with    Abdominal Pain     Pt presents ambulatory stating "Bartholome Snellen been having generalized abdominal pain for the past 2 wks after I ate food. The pain starts in my abdomen and goes throughout my body." Denies n/v/d. Denies urinary sx. HPI    Patient is a 78-year-old female presenting to the emergency department for abdominal pain. Patient describes the pain as sharp and all over. Pain radiates throughout her abdomen. She denies any associated nausea or vomiting. She denies any change in bowel bladder habits. Patient has had normal p.o. intake. She denies any fevers or chills or any recent URI symptoms. Patient admits to some generalized malaise. Patient has had a history of liver transplant x2. Taking all medications as prescribed. Other surgical history includes cholecystectomy. Prior to Admission Medications   Prescriptions Last Dose Informant Patient Reported? Taking?   calcipotriene (DOVONEX) 0.005 % topical solution   Yes No   Sig: Apply 1-2 drops to psoriasis areas nightly as needed. clobetasol (TEMOVATE) 0.05 % ointment   Yes No   Sig: apply A THN LAYER TO PINK SCALY PATCHES TWICE DAILY FOR 2 TO 7 DA. ..  (REFER TO PRESCRIPTION NOTES). ondansetron (Zofran ODT) 4 mg disintegrating tablet   No No   Sig: Take 1 tablet (4 mg total) by mouth every 6 (six) hours as needed for nausea or vomiting   tacrolimus (PROGRAF) 1 mg capsule   Yes No   Sig: Take 2 mg by mouth      Facility-Administered Medications: None       Past Medical History:   Diagnosis Date    Biliary atresia     Migraines     Portal vein thrombosis        Past Surgical History:   Procedure Laterality Date    HEPATITIS B SURFACE AB QN,LIVER TRANSPLANT (HISTORICAL)      LIVER SURGERY      transplant       History reviewed. No pertinent family history. I have reviewed and agree with the history as documented.     E-Cigarette/Vaping    E-Cigarette Use Never User      E-Cigarette/Vaping Substances Social History     Tobacco Use    Smoking status: Never    Smokeless tobacco: Never   Vaping Use    Vaping Use: Never used   Substance Use Topics    Alcohol use: Yes     Comment: occ    Drug use: Never       Review of Systems   Constitutional:  Positive for fatigue. Negative for chills and fever. HENT:  Negative for ear pain and sore throat. Eyes:  Negative for pain and visual disturbance. Respiratory:  Negative for cough and shortness of breath. Cardiovascular:  Negative for chest pain and palpitations. Gastrointestinal:  Positive for abdominal pain. Negative for vomiting. Genitourinary:  Negative for dysuria and hematuria. Musculoskeletal:  Negative for arthralgias and back pain. Skin:  Negative for color change and rash. Neurological:  Negative for seizures and syncope. All other systems reviewed and are negative. Physical Exam  Physical Exam  Vitals and nursing note reviewed. Constitutional:       General: She is not in acute distress. Appearance: She is well-developed. HENT:      Head: Normocephalic and atraumatic. Eyes:      Conjunctiva/sclera: Conjunctivae normal.   Cardiovascular:      Rate and Rhythm: Normal rate. Heart sounds: No murmur heard. Pulmonary:      Effort: Pulmonary effort is normal. No respiratory distress. Breath sounds: Normal breath sounds. Abdominal:      Palpations: Abdomen is soft. Tenderness: There is generalized abdominal tenderness. Musculoskeletal:         General: No swelling. Cervical back: Neck supple. Skin:     General: Skin is warm and dry. Capillary Refill: Capillary refill takes less than 2 seconds. Neurological:      Mental Status: She is alert.    Psychiatric:         Mood and Affect: Mood normal.         Vital Signs  ED Triage Vitals [10/29/23 1410]   Temperature Pulse Respirations Blood Pressure SpO2   98.1 °F (36.7 °C) 94 18 137/70 100 %      Temp Source Heart Rate Source Patient Position - Orthostatic VS BP Location FiO2 (%)   Temporal Monitor -- -- --      Pain Score       7           Vitals:    10/29/23 1410   BP: 137/70   Pulse: 94         Visual Acuity      ED Medications  Medications - No data to display    Diagnostic Studies  Results Reviewed       Procedure Component Value Units Date/Time    CBC and differential [[id number]]     Lab Status: No result Specimen: Blood     Comprehensive metabolic panel [[id number]]     Lab Status: No result Specimen: Blood     Lipase [[id number]]     Lab Status: No result Specimen: Blood     POCT pregnancy, urine [[id number]]     Lab Status: No result Specimen: Urine     UA (URINE) with reflex to Scope [[id number]]     Lab Status: No result Specimen: Urine                    No orders to display              Procedures  Procedures         ED Course                               SBIRT 20yo+      Flowsheet Row Most Recent Value   Initial Alcohol Screen: US AUDIT-C     1. How often do you have a drink containing alcohol? 0 Filed at: 10/29/2023 1409   2. How many drinks containing alcohol do you have on a typical day you are drinking? 0 Filed at: 10/29/2023 1409   3b. FEMALE Any Age, or MALE 65+: How often do you have 4 or more drinks on one occassion? 0 Filed at: 10/29/2023 1409   Audit-C Score 0 Filed at: 10/29/2023 1409   OLAF: How many times in the past year have you. .. Used an illegal drug or used a prescription medication for non-medical reasons? Never Filed at: 10/29/2023 1409                      Medical Decision Making  Amount and/or Complexity of Data Reviewed  Labs: ordered. Radiology: ordered. Risk  Prescription drug management. Disposition  Final diagnoses:   None     ED Disposition       None          Follow-up Information    None         Patient's Medications   Discharge Prescriptions    No medications on file       No discharge procedures on file.     PDMP Review       None            ED Provider  Electronically Signed by Negative results do not preclude SARS-CoV-2  infection and should not be used as the sole basis for treatment or other  patient management decisions. Negative results must be combined with  clinical observations, patient history, and epidemiological information. This test has not been FDA cleared or approved. This test has been authorized by FDA under an Emergency Use Authorization  (EUA). This test is only authorized for the duration of time the  declaration that circumstances exist justifying the authorization of the  emergency use of an in vitro diagnostic tests for detection of SARS-CoV-2  virus and/or diagnosis of COVID-19 infection under section 564(b)(1) of  the Act, 21 U. S.C. 150IMB-1(B)(9), unless the authorization is terminated  or revoked sooner. The test has been validated but independent review by FDA  and CLIA is pending. Test performed using [employer]: This RT-PCR assay targets N2,  a region unique to SARS-CoV-2. A conserved region in the E-gene was chosen  for pan-Sarbecovirus detection which includes SARS-CoV-2. According to CMS-2020-01-R, this platform meets the definition of high-throughput technology.     Comprehensive metabolic panel [[id number]]  (Abnormal) Collected: 10/29/23 1541    Lab Status: Final result Specimen: Blood from Arm, Right Updated: 10/29/23 1608     Sodium 137 mmol/L      Potassium 3.9 mmol/L      Chloride 102 mmol/L      CO2 30 mmol/L      ANION GAP 5 mmol/L      BUN 10 mg/dL      Creatinine 0.55 mg/dL      Glucose 88 mg/dL      Calcium 9.7 mg/dL      AST 16 U/L      ALT 12 U/L      Alkaline Phosphatase 78 U/L      Total Protein 7.9 g/dL      Albumin 4.6 g/dL      Total Bilirubin 1.09 mg/dL      eGFR 134 ml/min/1.73sq m     Narrative:      [place] guidelines for Chronic Kidney Disease (CKD):     Stage 1 with normal or high GFR (GFR > 90 mL/min/1.73 square meters)    Stage 2 Mild CKD (GFR = 60-89 mL/min/1.73 square meters)    Stage 3A Moderate CKD (GFR = 45-59 mL/min/1.73 square meters)    Stage 3B Moderate CKD (GFR = 30-44 mL/min/1.73 square meters)    Stage 4 Severe CKD (GFR = 15-29 mL/min/1.73 square meters)    Stage 5 End Stage CKD (GFR <15 mL/min/1.73 square meters)  Note: GFR calculation is accurate only with a steady state creatinine    Lipase [[id number]]  (Normal) Collected: 10/29/23 1541    Lab Status: Final result Specimen: Blood from Arm, Right Updated: 10/29/23 1608     Lipase 12 u/L     Urine Microscopic [[id number]]  (Abnormal) Collected: 10/29/23 1541    Lab Status: Final result Specimen: Urine, Clean Catch Updated: 10/29/23 1605     RBC, UA 2-4 /hpf      WBC, UA 10-20 /hpf      Epithelial Cells Innumerable /hpf      Bacteria, UA Occasional /hpf      Budding Yeast Present    UA (URINE) with reflex to Scope [[id number]]  (Abnormal) Collected: 10/29/23 1541    Lab Status: Final result Specimen: Urine, Clean Catch Updated: 10/29/23 1600     Color, UA Yellow     Clarity, UA Extra Turbid     Specific Gravity, UA 1.013     pH, UA 8.0     Leukocytes, UA Moderate     Nitrite, UA Negative     Protein, UA Negative mg/dl      Glucose, UA Negative mg/dl      Ketones, UA Negative mg/dl      Urobilinogen, UA <2.0 mg/dl      Bilirubin, UA Negative     Occult Blood, UA Trace    CBC and differential [[id number]] Collected: 10/29/23 1541    Lab Status: Final result Specimen: Blood from Arm, Right Updated: 10/29/23 1551     WBC 8.55 Thousand/uL      RBC 4.84 Million/uL      Hemoglobin 14.6 g/dL      Hematocrit 43.2 %      MCV 89 fL      MCH 30.2 pg      MCHC 33.8 g/dL      RDW 12.9 %      MPV 9.7 fL      Platelets 859 Thousands/uL      nRBC 0 /100 WBCs      Neutrophils Relative 59 %      Immat GRANS % 0 %      Lymphocytes Relative 28 %      Monocytes Relative 9 %      Eosinophils Relative 3 %      Basophils Relative 1 %      Neutrophils Absolute 5.07 Thousands/µL      Immature Grans Absolute 0.01 Thousand/uL      Lymphocytes Absolute 2.41 Thousands/µL Monocytes Absolute 0.73 Thousand/µL      Eosinophils Absolute 0.29 Thousand/µL      Basophils Absolute 0.04 Thousands/µL     POCT pregnancy, urine [[id number]]  (Normal) Resulted: 10/29/23 1533    Lab Status: Final result Specimen: Urine Updated: 10/29/23 1548     EXT Preg Test, Ur Negative     Control Valid                   CT abdomen pelvis with contrast   Final Result by Elizabeth Nelson MD (10/29 1708)      Wall thickening of the gastric antrum may represent peptic ulcer disease/gastritis in the appropriate clinical situation. Consider follow up upper endoscopy. Workstation performed: [id number]                    Procedures  Procedures         ED Course                               SBIRT 22yo+      Flowsheet Row Most Recent Value   Initial Alcohol Screen: US AUDIT-C     1. How often do you have a drink containing alcohol? 0 Filed at: 10/29/2023 1409   2. How many drinks containing alcohol do you have on a typical day you are drinking? 0 Filed at: 10/29/2023 1409   3b. FEMALE Any Age, or MALE 65+: How often do you have 4 or more drinks on one occassion? 0 Filed at: 10/29/2023 1409   Audit-C Score 0 Filed at: 10/29/2023 1409   OLAF: How many times in the past year have you. .. Used an illegal drug or used a prescription medication for non-medical reasons? Never Filed at: 10/29/2023 1409                      Medical Decision Making  Differential: gastritis, uti, appendicitis, gerd    Patient is a 81-year-old female present emerged department no acute respiratory distress and vital signs overall unremarkable. Patient has a significant past medical history of liver transplant x2. CMP and CBC overall unremarkable. Negative pregnancy test.  Urine shows moderate leukocytes. WBCs also elevated will treat for UTI. CT abdomen and pelvis shows wall thickening with concerns of gastritis or peptic ulcer disease. Patient was given multimodal interventions here.     Patient given discussion of symptomatic management and return follow-up precautions. Patient informed to follow-up with GI for reevaluation. Disposition discharge      Amount and/or Complexity of Data Reviewed  Labs: ordered. Radiology: ordered. Risk  OTC drugs. Prescription drug management. Disposition  Final diagnoses:   Abdominal pain   Gastritis   UTI (urinary tract infection)     Time reflects when diagnosis was documented in both MDM as applicable and the Disposition within this note       Time User Action Codes Description Comment    10/29/2023  5:24 PM David Derrick THURMAN Add [R10.9] Abdominal pain     10/29/2023  5:24 PM David Derrick THURMAN Add [K29.70] Gastritis     10/29/2023  5:24 PM David Derrick THURMAN Add [N39.0] UTI (urinary tract infection)           ED Disposition       ED Disposition   Discharge    Condition   Stable    Date/Time   Sun Oct 29, 2023 [id number] [facility] discharge to home/self care. Follow-up Information       Follow up With Specialties Details Why Contact Info    Stephan Romero MD Gastroenterology, Transplant Hepatology, Internal Medicine, Hepatology Schedule an appointment as soon as possible for a visit   [address] [place] [address]  [phone]              Discharge Medication List as of 10/29/2023  5:35 PM        CONTINUE these medications which have NOT CHANGED    Details   calcipotriene (DOVONEX) 0.005 % topical solution Apply 1-2 drops to psoriasis areas nightly as needed., Historical Med      clobetasol (TEMOVATE) 0.05 % ointment apply A THN LAYER TO PINK SCALY PATCHES TWICE DAILY FOR 2 TO 7 DA. ..  (REFER TO PRESCRIPTION NOTES). , Historical Med      ondansetron (Zofran ODT) 4 mg disintegrating tablet Take 1 tablet (4 mg total) by mouth every 6 (six) hours as needed for nausea or vomiting, Starting Sun 11/6/2022, Normal      tacrolimus (PROGRAF) 1 mg capsule Take 2 mg by mouth, Starting Wed 5/12/2021, Historical Med             No discharge procedures on file.     PDMP Review       None            ED Provider  Electronically Signed by             Rayshawn Marques DO  11/11/23 4285

## 2023-10-30 LAB — TACROLIMUS BLD-MCNC: 6.9 NG/ML (ref 3–15)

## 2023-11-06 ENCOUNTER — CONSULT (OUTPATIENT)
Dept: GASTROENTEROLOGY | Facility: CLINIC | Age: 23
End: 2023-11-06
Payer: COMMERCIAL

## 2023-11-06 VITALS
BODY MASS INDEX: 26.07 KG/M2 | TEMPERATURE: 98.5 F | DIASTOLIC BLOOD PRESSURE: 78 MMHG | WEIGHT: 132.8 LBS | HEIGHT: 60 IN | SYSTOLIC BLOOD PRESSURE: 116 MMHG

## 2023-11-06 DIAGNOSIS — R10.84 GENERALIZED ABDOMINAL PAIN: Primary | ICD-10-CM

## 2023-11-06 PROCEDURE — 99204 OFFICE O/P NEW MOD 45 MIN: CPT | Performed by: INTERNAL MEDICINE

## 2023-11-06 RX ORDER — ESCITALOPRAM OXALATE 5 MG/1
5 TABLET ORAL DAILY
COMMUNITY
Start: 2023-09-18

## 2023-11-06 RX ORDER — GABAPENTIN 100 MG/1
100 CAPSULE ORAL 3 TIMES DAILY
Qty: 60 CAPSULE | Refills: 3 | Status: SHIPPED | OUTPATIENT
Start: 2023-11-06

## 2023-11-06 NOTE — PROGRESS NOTES
[facility] - Outpatient Consultation  Juan J Malloy 25 y.o. female MRN: [id number]  Encounter: [id number]          ASSESSMENT AND PLAN:      1. Generalized abdominal pain  gabapentin (Neurontin) 100 mg capsule          Her symptoms do not correlate with gastritis. Will try nerve modulator Neurontin at low dose and see if this helps. If no improvement can consider EGD especially in light of the recent imaging.  ______________________________________________________________    HPI:  uJan J Malloy is a 25y.o. year old female who presents to the office for evaluation of abdominal pain. She is s/p OLT x 2 in 2002 and 6488 with complicated history including RY anastomosis for hyperammonemia and malnurition. She has been having RUQ pain (suggested to be from adhesions) and abdominal migraines. She has vague pain that starts abdomen and radiates all over her body. It radiates to the side, chest, and over her back. Feels like a sharp pain. Had CT while in the ED that showed possible gastric wall thickening c/f gastritis. I have personally viewed the images in PACS. However, symptoms do not correlate with gastritis. REVIEW OF SYSTEMS:    CONSTITUTIONAL: Denies any fever, chills, rigors, and weight loss. HEENT: No earache or tinnitus. Denies hearing loss or visual disturbances. CARDIOVASCULAR: No chest pain or palpitations. RESPIRATORY: Denies any cough, hemoptysis, shortness of breath or dyspnea on exertion. GASTROINTESTINAL: As noted in the History of Present Illness. GENITOURINARY: No problems with urination. Denies any hematuria or dysuria. NEUROLOGIC: No dizziness or vertigo, denies headaches. MUSCULOSKELETAL: Denies any muscle or joint pain. SKIN: Denies skin rashes or itching. ENDOCRINE: Denies excessive thirst. Denies intolerance to heat or cold. PSYCHOSOCIAL: Denies depression or anxiety. Denies any recent memory loss.        Historical Information Past Medical History:   Diagnosis Date    Biliary atresia     Migraines     Portal vein thrombosis      Past Surgical History:   Procedure Laterality Date    HEPATITIS B SURFACE AB QN,LIVER TRANSPLANT (HISTORICAL)      LIVER SURGERY      transplant     Social History   Social History     Substance and Sexual Activity   Alcohol Use Yes    Comment: occ     Social History     Substance and Sexual Activity   Drug Use Never     Social History     Tobacco Use   Smoking Status Never   Smokeless Tobacco Never     History reviewed. No pertinent family history. Meds/Allergies       Current Outpatient Medications:     calcipotriene (DOVONEX) 0.005 % topical solution    clobetasol (TEMOVATE) 0.05 % ointment    escitalopram (LEXAPRO) 5 mg tablet    ondansetron (Zofran ODT) 4 mg disintegrating tablet    tacrolimus (PROGRAF) 1 mg capsule    No Known Allergies        Objective     Blood pressure 116/78, temperature 98.5 °F (36.9 °C), temperature source Tympanic, height 5' (1.524 m), weight 60.2 kg (132 lb 12.8 oz), last menstrual period 09/20/2023. Body mass index is 25.94 kg/m². PHYSICAL EXAM:      General Appearance:   Alert, cooperative, no distress   HEENT:   Normocephalic, atraumatic, anicteric. Lungs:   Equal chest rise and unlabored breathing, normal cough   Heart:   No visualized JVD   Abdomen:   Soft, non-tender, non-distended; no masses, no organomegaly    Genitalia:   Deferred    Rectal:   Deferred    Extremities:  No cyanosis, clubbing or edema    Pulses:  Musculoskeletal:  2+ and symmetric  Normal range of motion visualized    Skin:  Neuro:  No jaundice, rashes, or lesions   Alert and appropriate       Lab Results:   No visits with results within 1 Day(s) from this visit.    Latest known visit with results is:   Admission on 10/29/2023, Discharged on 10/29/2023   Component Date Value    WBC 10/29/2023 8.55     RBC 10/29/2023 4.84     Hemoglobin 10/29/2023 14.6     Hematocrit 10/29/2023 43.2     MCV 10/29/2023 89     MCH 10/29/2023 30.2     MCHC 10/29/2023 33.8     RDW 10/29/2023 12.9     MPV 10/29/2023 9.7     Platelets 18/71/9203 289     nRBC 10/29/2023 0     Neutrophils Relative 10/29/2023 59     Immat GRANS % 10/29/2023 0     Lymphocytes Relative 10/29/2023 28     Monocytes Relative 10/29/2023 9     Eosinophils Relative 10/29/2023 3     Basophils Relative 10/29/2023 1     Neutrophils Absolute 10/29/2023 5.07     Immature Grans Absolute 10/29/2023 0.01     Lymphocytes Absolute 10/29/2023 2.41     Monocytes Absolute 10/29/2023 0.73     Eosinophils Absolute 10/29/2023 0.29     Basophils Absolute 10/29/2023 0.04     Sodium 10/29/2023 137     Potassium 10/29/2023 3.9     Chloride 10/29/2023 102     CO2 10/29/2023 30     ANION GAP 10/29/2023 5     BUN 10/29/2023 10     Creatinine 10/29/2023 0.55 (L)     Glucose 10/29/2023 88     Calcium 10/29/2023 9.7     AST 10/29/2023 16     ALT 10/29/2023 12     Alkaline Phosphatase 10/29/2023 78     Total Protein 10/29/2023 7.9     Albumin 10/29/2023 4.6     Total Bilirubin 10/29/2023 1.09 (H)     eGFR 10/29/2023 134     Lipase 10/29/2023 12     EXT Preg Test, Ur 10/29/2023 Negative     Control 10/29/2023 Valid     Color, UA 10/29/2023 Yellow     Clarity, UA 10/29/2023 Extra Turbid     Specific Gravity, UA 10/29/2023 1.013     pH, UA 10/29/2023 8.0     Leukocytes, UA 10/29/2023 Moderate (A)     Nitrite, UA 10/29/2023 Negative     Protein, UA 10/29/2023 Negative     Glucose, UA 10/29/2023 Negative     Ketones, UA 10/29/2023 Negative     Urobilinogen, UA 10/29/2023 <2.0     Bilirubin, UA 10/29/2023 Negative     Occult Blood, UA 10/29/2023 Trace (A)     SARS-CoV-2 10/29/2023 Negative     INFLUENZA A PCR 10/29/2023 Negative     INFLUENZA B PCR 10/29/2023 Negative     RSV PCR 10/29/2023 Negative     TACROLIMUS 10/29/2023 6.9     RBC, UA 10/29/2023 2-4 (A)     WBC, UA 10/29/2023 10-20 (A)     Epithelial Cells 10/29/2023 Innumerable (A)     Bacteria, UA 10/29/2023 Occasional Budding Yeast 10/29/2023 Present          Radiology Results:   CT abdomen pelvis with contrast    Result Date: 10/29/2023  Narrative: CT ABDOMEN AND PELVIS WITH IV CONTRAST INDICATION:   generalized abdominal pain, hx liver transplant x2. COMPARISON: 11/7/2022 TECHNIQUE:  CT examination of the abdomen and pelvis was performed. Multiplanar 2D reformatted images were created from the source data. This examination, like all CT scans performed in the [facility], was performed utilizing techniques to minimize radiation dose exposure, including the use of iterative reconstruction and automated exposure control. Radiation dose length product (DLP) for this visit:  607 mGy-cm IV Contrast:  100 mL of iohexol (OMNIPAQUE) Enteric Contrast:  Enteric contrast was not administered. Lack of oral contrast limits the sensitivity for pathology within the bowel. FINDINGS: ABDOMEN LOWER CHEST:  No clinically significant abnormality identified in the visualized lower chest. LIVER/BILIARY TREE: There is redemonstration of pneumobilia consistent with prior liver transplant. The portal vein is patent. GALLBLADDER: Gallbladder is surgically absent. SPLEEN:  Unremarkable. PANCREAS:  Unremarkable. ADRENAL GLANDS:  Unremarkable. KIDNEYS/URETERS: One or more simple renal cyst(s) is noted. Otherwise unremarkable kidneys. No hydronephrosis. STOMACH AND BOWEL: There is prominent wall thickening at the gastric antrum. Cannot exclude peptic ulcer disease in the appropriate clinical situation. There is no bowel obstruction. APPENDIX: A normal appendix was visualized. ABDOMINOPELVIC CAVITY:  No ascites. No pneumoperitoneum. No lymphadenopathy. VESSELS: Redemonstration of abdominal varices. No abdominal aortic aneurysm. PELVIS REPRODUCTIVE ORGANS:  Unremarkable for patient's age. URINARY BLADDER:  Unremarkable. ABDOMINAL WALL/INGUINAL REGIONS:  Unremarkable. OSSEOUS STRUCTURES:  No acute fracture or destructive osseous lesion. Impression: Wall thickening of the gastric antrum may represent peptic ulcer disease/gastritis in the appropriate clinical situation. Consider follow up upper endoscopy.  Workstation performed: [id number]         Answers submitted by the patient for this visit:  Abdominal Pain Questionnaire (Submitted on 11/6/2023)  Chief Complaint: Abdominal pain  Chronicity: new  Onset: 1 to 4 weeks ago  Onset quality: sudden  Frequency: constantly  Episode duration: 24 Hours  Progression since onset: waxing and waning  Pain location: generalized abdominal region  Pain - numeric: 8/10  Pain quality: sharp  Radiates to: LLQ, LUQ, RLQ, RUQ, epigastric region, periumbilical region, suprapubic region, left shoulder, right shoulder, chest, back, left flank, right flank  anorexia: No  arthralgias: Yes  belching: No  constipation: No  diarrhea: Yes  dysuria: No  fever: No  flatus: No  frequency: No  headaches: Yes  hematochezia: No  hematuria: Yes  melena: No  myalgias: Yes  nausea: Yes  weight loss: No  vomiting: No  Aggravated by: being still, certain positions, coughing, deep breathing, movement  Relieved by: nothing  Diagnostic workup: CT scan

## 2023-11-06 NOTE — PATIENT INSTRUCTIONS
Will try nerve modulator Neurontin at low dose and see if this helps. If no improvement can consider EGD especially in light of the recent imaging.     Patient is scheduled for a 2 month follow up with Dr. Stella Kelley

## 2023-12-08 ENCOUNTER — HOSPITAL ENCOUNTER (EMERGENCY)
Facility: HOSPITAL | Age: 23
Discharge: HOME | End: 2023-12-08
Attending: EMERGENCY MEDICINE
Payer: COMMERCIAL

## 2023-12-08 ENCOUNTER — APPOINTMENT (EMERGENCY)
Dept: CT IMAGING | Facility: HOSPITAL | Age: 23
End: 2023-12-08
Payer: COMMERCIAL

## 2023-12-08 VITALS
TEMPERATURE: 99 F | RESPIRATION RATE: 16 BRPM | DIASTOLIC BLOOD PRESSURE: 90 MMHG | OXYGEN SATURATION: 94 % | HEART RATE: 99 BPM | SYSTOLIC BLOOD PRESSURE: 137 MMHG

## 2023-12-08 DIAGNOSIS — R11.0 NAUSEA: ICD-10-CM

## 2023-12-08 DIAGNOSIS — R10.9 ABDOMINAL PAIN: Primary | ICD-10-CM

## 2023-12-08 LAB
ALBUMIN SERPL BCP-MCNC: 4.8 G/DL (ref 3.5–5)
ALP SERPL-CCNC: 76 U/L (ref 34–104)
ALT SERPL W P-5'-P-CCNC: 14 U/L (ref 7–52)
ANION GAP SERPL CALCULATED.3IONS-SCNC: 10 MMOL/L
AST SERPL W P-5'-P-CCNC: 18 U/L (ref 13–39)
BACTERIA UR QL AUTO: ABNORMAL /HPF
BASOPHILS # BLD AUTO: 0.03 THOUSANDS/ÂΜL (ref 0–0.1)
BASOPHILS NFR BLD AUTO: 0 % (ref 0–1)
BILIRUB SERPL-MCNC: 1.2 MG/DL (ref 0.2–1)
BILIRUB UR QL STRIP: NEGATIVE
BUDDING YEAST: PRESENT
BUN SERPL-MCNC: 11 MG/DL (ref 5–25)
CALCIUM SERPL-MCNC: 9.3 MG/DL (ref 8.4–10.2)
CHLORIDE SERPL-SCNC: 103 MMOL/L (ref 96–108)
CLARITY UR: ABNORMAL
CO2 SERPL-SCNC: 24 MMOL/L (ref 21–32)
COLOR UR: YELLOW
CREAT SERPL-MCNC: 0.65 MG/DL (ref 0.6–1.3)
EOSINOPHIL # BLD AUTO: 0.21 THOUSAND/ÂΜL (ref 0–0.61)
EOSINOPHIL NFR BLD AUTO: 3 % (ref 0–6)
ERYTHROCYTE [DISTWIDTH] IN BLOOD BY AUTOMATED COUNT: 12.7 % (ref 11.6–15.1)
GFR SERPL CREATININE-BSD FRML MDRD: 127 ML/MIN/1.73SQ M
GLUCOSE SERPL-MCNC: 91 MG/DL (ref 65–140)
GLUCOSE UR STRIP-MCNC: NEGATIVE MG/DL
HCG SERPL QL: NEGATIVE
HCT VFR BLD AUTO: 42.4 % (ref 34.8–46.1)
HGB BLD-MCNC: 14.5 G/DL (ref 11.5–15.4)
HGB UR QL STRIP.AUTO: NEGATIVE
IMM GRANULOCYTES # BLD AUTO: 0.02 THOUSAND/UL (ref 0–0.2)
IMM GRANULOCYTES NFR BLD AUTO: 0 % (ref 0–2)
KETONES UR STRIP-MCNC: NEGATIVE MG/DL
LEUKOCYTE ESTERASE UR QL STRIP: NEGATIVE
LIPASE SERPL-CCNC: 11 U/L (ref 11–82)
LYMPHOCYTES # BLD AUTO: 1.78 THOUSANDS/ÂΜL (ref 0.6–4.47)
LYMPHOCYTES NFR BLD AUTO: 25 % (ref 14–44)
MCH RBC QN AUTO: 29.8 PG (ref 26.8–34.3)
MCHC RBC AUTO-ENTMCNC: 34.2 G/DL (ref 31.4–37.4)
MCV RBC AUTO: 87 FL (ref 82–98)
MONOCYTES # BLD AUTO: 0.63 THOUSAND/ÂΜL (ref 0.17–1.22)
MONOCYTES NFR BLD AUTO: 9 % (ref 4–12)
MUCOUS THREADS UR QL AUTO: ABNORMAL
NEUTROPHILS # BLD AUTO: 4.34 THOUSANDS/ÂΜL (ref 1.85–7.62)
NEUTS SEG NFR BLD AUTO: 63 % (ref 43–75)
NITRITE UR QL STRIP: NEGATIVE
NON-SQ EPI CELLS URNS QL MICRO: ABNORMAL /HPF
NRBC BLD AUTO-RTO: 0 /100 WBCS
PH UR STRIP.AUTO: 7 [PH]
PLATELET # BLD AUTO: 285 THOUSANDS/UL (ref 149–390)
PMV BLD AUTO: 9.6 FL (ref 8.9–12.7)
POTASSIUM SERPL-SCNC: 3.7 MMOL/L (ref 3.5–5.3)
PROT SERPL-MCNC: 8.2 G/DL (ref 6.4–8.4)
PROT UR STRIP-MCNC: ABNORMAL MG/DL
RBC # BLD AUTO: 4.86 MILLION/UL (ref 3.81–5.12)
RBC #/AREA URNS AUTO: ABNORMAL /HPF
SODIUM SERPL-SCNC: 137 MMOL/L (ref 135–147)
SP GR UR STRIP.AUTO: 1.02 (ref 1–1.03)
UROBILINOGEN UR STRIP-ACNC: <2 MG/DL
WBC # BLD AUTO: 7.01 THOUSAND/UL (ref 4.31–10.16)
WBC #/AREA URNS AUTO: ABNORMAL /HPF

## 2023-12-08 PROCEDURE — 74177 CT ABD & PELVIS W/CONTRAST: CPT

## 2023-12-08 PROCEDURE — 36415 COLL VENOUS BLD VENIPUNCTURE: CPT

## 2023-12-08 PROCEDURE — 99284 EMERGENCY DEPT VISIT MOD MDM: CPT | Performed by: EMERGENCY MEDICINE

## 2023-12-08 PROCEDURE — 84703 CHORIONIC GONADOTROPIN ASSAY: CPT | Performed by: EMERGENCY MEDICINE

## 2023-12-08 PROCEDURE — 80197 ASSAY OF TACROLIMUS: CPT | Performed by: EMERGENCY MEDICINE

## 2023-12-08 PROCEDURE — 80053 COMPREHEN METABOLIC PANEL: CPT | Performed by: EMERGENCY MEDICINE

## 2023-12-08 PROCEDURE — 96375 TX/PRO/DX INJ NEW DRUG ADDON: CPT

## 2023-12-08 PROCEDURE — 85025 COMPLETE CBC W/AUTO DIFF WBC: CPT | Performed by: EMERGENCY MEDICINE

## 2023-12-08 PROCEDURE — 99284 EMERGENCY DEPT VISIT MOD MDM: CPT

## 2023-12-08 PROCEDURE — 96374 THER/PROPH/DIAG INJ IV PUSH: CPT

## 2023-12-08 PROCEDURE — 83690 ASSAY OF LIPASE: CPT | Performed by: EMERGENCY MEDICINE

## 2023-12-08 PROCEDURE — 81001 URINALYSIS AUTO W/SCOPE: CPT | Performed by: EMERGENCY MEDICINE

## 2023-12-08 RX ORDER — MORPHINE SULFATE 4 MG/ML
4 INJECTION, SOLUTION INTRAMUSCULAR; INTRAVENOUS ONCE
Status: COMPLETED | OUTPATIENT
Start: 2023-12-08 | End: 2023-12-08

## 2023-12-08 RX ORDER — SUCRALFATE ORAL 1 G/10ML
1 SUSPENSION ORAL 4 TIMES DAILY
Qty: 414 ML | Refills: 0 | Status: SHIPPED | OUTPATIENT
Start: 2023-12-08

## 2023-12-08 RX ORDER — ONDANSETRON 2 MG/ML
4 INJECTION INTRAMUSCULAR; INTRAVENOUS ONCE
Status: COMPLETED | OUTPATIENT
Start: 2023-12-08 | End: 2023-12-08

## 2023-12-08 RX ORDER — DICYCLOMINE HCL 20 MG
20 TABLET ORAL 2 TIMES DAILY
Qty: 20 TABLET | Refills: 0 | Status: SHIPPED | OUTPATIENT
Start: 2023-12-08

## 2023-12-08 RX ORDER — DICYCLOMINE HCL 20 MG
20 TABLET ORAL ONCE
Status: COMPLETED | OUTPATIENT
Start: 2023-12-08 | End: 2023-12-08

## 2023-12-08 RX ORDER — ONDANSETRON 4 MG/1
4 TABLET, ORALLY DISINTEGRATING ORAL EVERY 8 HOURS PRN
Qty: 20 TABLET | Refills: 0 | Status: SHIPPED | OUTPATIENT
Start: 2023-12-08

## 2023-12-08 RX ADMIN — ONDANSETRON 4 MG: 2 INJECTION INTRAMUSCULAR; INTRAVENOUS at 14:50

## 2023-12-08 RX ADMIN — DICYCLOMINE HYDROCHLORIDE 20 MG: 20 TABLET ORAL at 14:50

## 2023-12-08 RX ADMIN — IOHEXOL 100 ML: 350 INJECTION, SOLUTION INTRAVENOUS at 15:12

## 2023-12-08 RX ADMIN — MORPHINE SULFATE 4 MG: 4 INJECTION INTRAVENOUS at 14:50

## 2023-12-08 NOTE — DISCHARGE INSTRUCTIONS
Take carafate as prescribed in addition to bentyl, zofran for nausea and follow up with your GI doctor for recheck as soon as possible

## 2023-12-08 NOTE — Clinical Note
Rafael Tiwari was seen and treated in our emergency department on 12/8/2023. Diagnosis: seen in ED    Adrian Castaneda  . She may return on this date: 12/11/2023         If you have any questions or concerns, please don't hesitate to call.       Franko Reynolds MD    ______________________________           _______________          _______________  Hospital Representative                              Date                                Time

## 2023-12-08 NOTE — ED PROVIDER NOTES
History  Chief Complaint   Patient presents with    Abdominal Pain     Pt reporting abdominal pain x8 weeks, c/o increased fatigue and nausea/diarrhea. HPI  24 yo F with PMH chronic abdominal pain, liver transplant x2 presents with abdominal pain, nausea and diarrhea for two months. Has had increased fatigue. Started gabapentin per GI but then took herself off as it was making her fatigued. Nothing makes better or worse. Has diarrhea with every bowel movement. Pain is diffuse and cramping. Prior to Admission Medications   Prescriptions Last Dose Informant Patient Reported? Taking?   calcipotriene (DOVONEX) 0.005 % topical solution  Self Yes No   Sig: Apply 1-2 drops to psoriasis areas nightly as needed. clobetasol (TEMOVATE) 0.05 % ointment  Self Yes No   Sig: apply A THN LAYER TO PINK SCALY PATCHES TWICE DAILY FOR 2 TO 7 DA. ..  (REFER TO PRESCRIPTION NOTES). escitalopram (LEXAPRO) 5 mg tablet  Self Yes No   Sig: Take 5 mg by mouth daily   gabapentin (Neurontin) 100 mg capsule   No No   Sig: Take 1 capsule (100 mg total) by mouth 3 (three) times a day   ondansetron (Zofran ODT) 4 mg disintegrating tablet  Self No No   Sig: Take 1 tablet (4 mg total) by mouth every 6 (six) hours as needed for nausea or vomiting   tacrolimus (PROGRAF) 1 mg capsule  Self Yes No   Sig: Take 2 mg by mouth      Facility-Administered Medications: None       Past Medical History:   Diagnosis Date    Anxiety     Biliary atresia     Migraines     Portal vein thrombosis        Past Surgical History:   Procedure Laterality Date    HEPATITIS B SURFACE AB QN,LIVER TRANSPLANT (HISTORICAL)      LIVER SURGERY      transplant       History reviewed. No pertinent family history. I have reviewed and agree with the history as documented.     E-Cigarette/Vaping    E-Cigarette Use Never User      E-Cigarette/Vaping Substances     Social History     Tobacco Use    Smoking status: Never    Smokeless tobacco: Never   Vaping Use    Vaping Use: Never used   Substance Use Topics    Alcohol use: Yes     Comment: occ    Drug use: Never       Review of Systems   Constitutional:  Positive for fatigue. Negative for chills and fever. HENT:  Negative for dental problem and ear pain. Eyes:  Negative for pain and redness. Respiratory:  Negative for cough and shortness of breath. Cardiovascular:  Negative for chest pain and palpitations. Gastrointestinal:  Positive for abdominal pain, diarrhea and nausea. Endocrine: Negative for polydipsia and polyphagia. Genitourinary:  Negative for dysuria and frequency. Musculoskeletal:  Negative for arthralgias and joint swelling. Skin:  Negative for color change and rash. Neurological:  Negative for dizziness and headaches. Psychiatric/Behavioral:  Negative for behavioral problems and confusion. All other systems reviewed and are negative. Physical Exam  Physical Exam  Vitals and nursing note reviewed. Constitutional:       General: She is not in acute distress. Appearance: She is well-developed. She is not diaphoretic. HENT:      Head: Atraumatic. Right Ear: External ear normal.      Left Ear: External ear normal.      Nose: Nose normal.   Eyes:      Conjunctiva/sclera: Conjunctivae normal.      Pupils: Pupils are equal, round, and reactive to light. Neck:      Vascular: No JVD. Cardiovascular:      Rate and Rhythm: Normal rate and regular rhythm. Heart sounds: Normal heart sounds. No murmur heard. Pulmonary:      Effort: Pulmonary effort is normal. No respiratory distress. Breath sounds: Normal breath sounds. No wheezing. Abdominal:      General: Bowel sounds are normal. There is no distension. Palpations: Abdomen is soft. Tenderness: There is generalized abdominal tenderness. Musculoskeletal:         General: Normal range of motion. Cervical back: Normal range of motion and neck supple. Skin:     General: Skin is warm and dry.       Capillary Refill: Capillary refill takes less than 2 seconds. Neurological:      Mental Status: She is alert and oriented to person, place, and time. Cranial Nerves: No cranial nerve deficit. Psychiatric:         Behavior: Behavior normal.         Vital Signs  ED Triage Vitals [12/08/23 1131]   Temperature Pulse Respirations Blood Pressure SpO2   99 °F (37.2 °C) 99 16 137/90 94 %      Temp Source Heart Rate Source Patient Position - Orthostatic VS BP Location FiO2 (%)   Temporal Monitor Sitting Left arm --      Pain Score       --           Vitals:    12/08/23 1131   BP: 137/90   Pulse: 99   Patient Position - Orthostatic VS: Sitting         Visual Acuity      ED Medications  Medications   morphine injection 4 mg (4 mg Intravenous Given 12/8/23 1450)   dicyclomine (BENTYL) tablet 20 mg (20 mg Oral Given 12/8/23 1450)   ondansetron (ZOFRAN) injection 4 mg (4 mg Intravenous Given 12/8/23 1450)   iohexol (OMNIPAQUE) 350 MG/ML injection (MULTI-DOSE) 100 mL (100 mL Intravenous Given 12/8/23 1512)       Diagnostic Studies  Results Reviewed       Procedure Component Value Units Date/Time    Tacrolimus level [[id number]] Collected: 12/08/23 1202    Lab Status:  In process Specimen: Blood from Arm, Right Updated: 12/08/23 1536    Urine Microscopic [[id number]]  (Abnormal) Collected: 12/08/23 1455    Lab Status: Final result Specimen: Urine Updated: 12/08/23 1512     RBC, UA 4-10 /hpf      WBC, UA 1-2 /hpf      Epithelial Cells Occasional /hpf      Bacteria, UA None Seen /hpf      MUCUS THREADS Moderate     Budding Yeast Present    UA (URINE) with reflex to Scope [[id number]]  (Abnormal) Collected: 12/08/23 1455    Lab Status: Final result Specimen: Urine Updated: 12/08/23 1503     Color, UA Yellow     Clarity, UA Extra Turbid     Specific Gravity, UA 1.025     pH, UA 7.0     Leukocytes, UA Negative     Nitrite, UA Negative     Protein, UA Trace mg/dl      Glucose, UA Negative mg/dl      Ketones, UA Negative mg/dl      Urobilinogen, UA <2.0 mg/dl      Bilirubin, UA Negative     Occult Blood, UA Negative    hCG, qualitative pregnancy [[id number]]  (Normal) Collected: 12/08/23 1202    Lab Status: Final result Specimen: Blood from Arm, Right Updated: 12/08/23 1239     Preg, Serum Negative    Lipase [[id number]]  (Normal) Collected: 12/08/23 1202    Lab Status: Final result Specimen: Blood from Arm, Right Updated: 12/08/23 1229     Lipase 11 u/L     Comprehensive metabolic panel [[id number]]  (Abnormal) Collected: 12/08/23 1202    Lab Status: Final result Specimen: Blood from Arm, Right Updated: 12/08/23 1229     Sodium 137 mmol/L      Potassium 3.7 mmol/L      Chloride 103 mmol/L      CO2 24 mmol/L      ANION GAP 10 mmol/L      BUN 11 mg/dL      Creatinine 0.65 mg/dL      Glucose 91 mg/dL      Calcium 9.3 mg/dL      AST 18 U/L      ALT 14 U/L      Alkaline Phosphatase 76 U/L      Total Protein 8.2 g/dL      Albumin 4.8 g/dL      Total Bilirubin 1.20 mg/dL      eGFR 127 ml/min/1.73sq m     Narrative:      [facility] guidelines for Chronic Kidney Disease (CKD):     Stage 1 with normal or high GFR (GFR > 90 mL/min/1.73 square meters)    Stage 2 Mild CKD (GFR = 60-89 mL/min/1.73 square meters)    Stage 3A Moderate CKD (GFR = 45-59 mL/min/1.73 square meters)    Stage 3B Moderate CKD (GFR = 30-44 mL/min/1.73 square meters)    Stage 4 Severe CKD (GFR = 15-29 mL/min/1.73 square meters)    Stage 5 End Stage CKD (GFR <15 mL/min/1.73 square meters)  Note: GFR calculation is accurate only with a steady state creatinine    CBC and differential [[id number]] Collected: 12/08/23 1202    Lab Status: Final result Specimen: Blood from Arm, Right Updated: 12/08/23 1210     WBC 7.01 Thousand/uL      RBC 4.86 Million/uL      Hemoglobin 14.5 g/dL      Hematocrit 42.4 %      MCV 87 fL      MCH 29.8 pg      MCHC 34.2 g/dL      RDW 12.7 %      MPV 9.6 fL      Platelets 251 Thousands/uL      nRBC 0 /100 WBCs      Neutrophils Relative 63 %      Immat GRANS % 0 %      Lymphocytes Relative 25 %      Monocytes Relative 9 %      Eosinophils Relative 3 %      Basophils Relative 0 %      Neutrophils Absolute 4.34 Thousands/µL      Immature Grans Absolute 0.02 Thousand/uL      Lymphocytes Absolute 1.78 Thousands/µL      Monocytes Absolute 0.63 Thousand/µL      Eosinophils Absolute 0.21 Thousand/µL      Basophils Absolute 0.03 Thousands/µL                    CT abdomen pelvis with contrast   Final Result by Eugenia Cervantes MD (12/08 1528)      Persistent mild gastric wall thickening of the antrum. Correlation for underlying gastritis/peptic ulcer disease advised. Endoscopy may be helpful. No bowel obstruction or CT evidence for colitis or diverticulitis. Stable additional findings as above. Workstation performed: [id number]                    Procedures  Procedures         ED Course                               SBIRT 20yo+      Flowsheet Row Most Recent Value   Initial Alcohol Screen: US AUDIT-C     1. How often do you have a drink containing alcohol? 0 Filed at: 12/08/2023 1455   2. How many drinks containing alcohol do you have on a typical day you are drinking? 0 Filed at: 12/08/2023 1455   3a. Male UNDER 65: How often do you have five or more drinks on one occasion? 0 Filed at: 12/08/2023 1455   3b. FEMALE Any Age, or MALE 65+: How often do you have 4 or more drinks on one occassion? 0 Filed at: 12/08/2023 1455   Audit-C Score 0 Filed at: 12/08/2023 1455   OLAF: How many times in the past year have you. .. Used an illegal drug or used a prescription medication for non-medical reasons? Never Filed at: 12/08/2023 1455                      Medical Decision Making  Amount and/or Complexity of Data Reviewed  Labs: ordered. Radiology: ordered. Risk  Prescription drug management.            Labs unremarkable and reassuring, CT consistent with possible gastritis, will rx carafate, bentyl, zofran and refer to GI  Disposition  Final diagnoses: Abdominal pain   Nausea     Time reflects when diagnosis was documented in both MDM as applicable and the Disposition within this note       Time User Action Codes Description Comment    12/8/2023  3:38 PM Hien Darius Yves [R10.9] Abdominal pain     12/8/2023  3:40 PM Hien Jean Yves [R11.0] Nausea           ED Disposition       ED Disposition   Discharge    Condition   Stable    Date/Time   Fri Dec 8, 2023 1538    [address] [facility] discharge to home/self care. Follow-up Information       Follow up With Specialties Details Why Contact Info Additional [address] [facility] Gastroenterology Call   [address]  [address] [address]  [phone] [employer] [facility], [facility] [facility], [address] [place] [address] [place], [facility], [place], [address] [facility]             Discharge Medication List as of 12/8/2023  3:42 PM        START taking these medications    Details   dicyclomine (BENTYL) 20 mg tablet Take 1 tablet (20 mg total) by mouth 2 (two) times a day, Starting Fri 12/8/2023, Normal      !! ondansetron (ZOFRAN-ODT) 4 mg disintegrating tablet Take 1 tablet (4 mg total) by mouth every 8 (eight) hours as needed for nausea, Starting Fri 12/8/2023, Normal      sucralfate (CARAFATE) 1 g/10 mL suspension Take 10 mL (1 g total) by mouth 4 (four) times a day, Starting Fri 12/8/2023, Normal       !! - Potential duplicate medications found. Please discuss with provider. CONTINUE these medications which have NOT CHANGED    Details   calcipotriene (DOVONEX) 0.005 % topical solution Apply 1-2 drops to psoriasis areas nightly as needed., Historical Med      clobetasol (TEMOVATE) 0.05 % ointment apply A THN LAYER TO PINK SCALY PATCHES TWICE DAILY FOR 2 TO 7 DA. ..  (REFER TO PRESCRIPTION NOTES). , Historical Med      escitalopram (LEXAPRO) 5 mg tablet Take 5 mg by mouth daily, Starting Mon 9/18/2023, Historical Med      gabapentin (Neurontin) 100 mg capsule Take 1 capsule (100 mg total) by mouth 3 (three) times a day, Starting Mon 11/6/2023, Normal      !! ondansetron (Zofran ODT) 4 mg disintegrating tablet Take 1 tablet (4 mg total) by mouth every 6 (six) hours as needed for nausea or vomiting, Starting Sun 11/6/2022, Normal      tacrolimus (PROGRAF) 1 mg capsule Take 2 mg by mouth, Starting Wed 5/12/2021, Historical Med       !! - Potential duplicate medications found. Please discuss with provider. No discharge procedures on file.     PDMP Review       None            ED Provider  Electronically Signed by             Berta Musa MD  12/08/23 9011

## 2023-12-08 NOTE — Clinical Note
Roberta Zheng was seen and treated in our emergency department on 12/8/2023. Diagnosis: seen in ED    Cristobal Flood  . She may return on this date: 12/11/2023         If you have any questions or concerns, please don't hesitate to call.       Fartun Perkins MD    ______________________________           _______________          _______________  Hospital Representative                              Date                                Time

## 2023-12-09 LAB — TACROLIMUS BLD-MCNC: 15 NG/ML (ref 3–15)

## 2024-01-09 ENCOUNTER — OFFICE VISIT (OUTPATIENT)
Dept: GASTROENTEROLOGY | Facility: CLINIC | Age: 24
End: 2024-01-09
Payer: COMMERCIAL

## 2024-01-09 VITALS
HEIGHT: 61 IN | BODY MASS INDEX: 25.86 KG/M2 | DIASTOLIC BLOOD PRESSURE: 80 MMHG | WEIGHT: 137 LBS | SYSTOLIC BLOOD PRESSURE: 127 MMHG | TEMPERATURE: 98.9 F

## 2024-01-09 DIAGNOSIS — R10.84 GENERALIZED ABDOMINAL PAIN: Primary | ICD-10-CM

## 2024-01-09 DIAGNOSIS — R93.5 ABNORMAL FINDINGS ON DIAGNOSTIC IMAGING OF ABDOMEN: ICD-10-CM

## 2024-01-09 PROCEDURE — 99214 OFFICE O/P EST MOD 30 MIN: CPT | Performed by: INTERNAL MEDICINE

## 2024-01-09 NOTE — PROGRESS NOTES
[facility] - Outpatient Follow-up Note  Domonique Rivers 23 y.o. female MRN: [id number]  Encounter: [id number]          ASSESSMENT AND PLAN:      1. Generalized abdominal pain  EGD      2. Abnormal findings on diagnostic imaging of abdomen          Will plan for EGD given gastric wall thickening on CT and persistent pain/discomfort.  ___________________________________________________________________      SUBJECTIVE:  22 year old female with history of OLT x 2 and Rosales en Y anastomosis for hyperammonemia and malnutrition who presents for follow up. Was having RUQ pain and abdominal migraines.  Tried Neurontin but had low energy and also developed diarrhea.   Went to the ED on Dec 8th for increased pain, diarrhea, fatigue. CT showed mild wall thickening in the stomach. I have personally viewed the images in PACS.      REVIEW OF SYSTEMS IS OTHERWISE NEGATIVE.      Historical Information   Past Medical History:   Diagnosis Date    Anxiety     Biliary atresia     Migraines     Portal vein thrombosis      Past Surgical History:   Procedure Laterality Date    HEPATITIS B SURFACE AB QN,LIVER TRANSPLANT (HISTORICAL)      LIVER SURGERY      transplant     Social History   Social History     Substance and Sexual Activity   Alcohol Use Yes    Comment: occ     Social History     Substance and Sexual Activity   Drug Use Never     Social History     Tobacco Use   Smoking Status Never   Smokeless Tobacco Never     History reviewed. No pertinent family history.    Meds/Allergies       Current Outpatient Medications:     calcipotriene (DOVONEX) 0.005 % topical solution    clobetasol (TEMOVATE) 0.05 % ointment    escitalopram (LEXAPRO) 5 mg tablet    ondansetron (ZOFRAN-ODT) 4 mg disintegrating tablet    tacrolimus (PROGRAF) 1 mg capsule    dicyclomine (BENTYL) 20 mg tablet    gabapentin (Neurontin) 100 mg capsule    ondansetron (Zofran ODT) 4 mg disintegrating tablet    sucralfate (CARAFATE) 1 g/10 mL  "suspension    No Known Allergies        Objective     Blood pressure 127/80, temperature 98.9 °F (37.2 °C), temperature source Tympanic, height 5' 1\" (1.549 m), weight 62.1 kg (137 lb). Body mass index is 25.89 kg/m².      PHYSICAL EXAM:      General Appearance:   Alert, cooperative, no distress   HEENT:   Normocephalic, atraumatic, anicteric.         Lungs:   Equal chest rise and unlabored breathing, normal cough   Heart:   No visualized JVD   Abdomen:   Soft, non-tender, non-distended; no masses, no organomegaly    Genitalia:   Deferred    Rectal:   Deferred    Extremities:  No cyanosis, clubbing or edema    Pulses:  Musculoskeletal:  2+ and symmetric  Normal range of motion visualized    Skin:  Neuro:  No jaundice, rashes, or lesions   Alert and appropriate           Lab Results:   No visits with results within 1 Day(s) from this visit.   Latest known visit with results is:   Admission on 12/08/2023, Discharged on 12/08/2023   Component Date Value    WBC 12/08/2023 7.01     RBC 12/08/2023 4.86     Hemoglobin 12/08/2023 14.5     Hematocrit 12/08/2023 42.4     MCV 12/08/2023 87     MCH 12/08/2023 29.8     MCHC 12/08/2023 34.2     RDW 12/08/2023 12.7     MPV 12/08/2023 9.6     Platelets 12/08/2023 285     nRBC 12/08/2023 0     Neutrophils Relative 12/08/2023 63     Immat GRANS % 12/08/2023 0     Lymphocytes Relative 12/08/2023 25     Monocytes Relative 12/08/2023 9     Eosinophils Relative 12/08/2023 3     Basophils Relative 12/08/2023 0     Neutrophils Absolute 12/08/2023 4.34     Immature Grans Absolute 12/08/2023 0.02     Lymphocytes Absolute 12/08/2023 1.78     Monocytes Absolute 12/08/2023 0.63     Eosinophils Absolute 12/08/2023 0.21     Basophils Absolute 12/08/2023 0.03     Sodium 12/08/2023 137     Potassium 12/08/2023 3.7     Chloride 12/08/2023 103     CO2 12/08/2023 24     ANION GAP 12/08/2023 10     BUN 12/08/2023 11     Creatinine 12/08/2023 0.65     Glucose 12/08/2023 91     Calcium 12/08/2023 9.3     " AST 12/08/2023 18     ALT 12/08/2023 14     Alkaline Phosphatase 12/08/2023 76     Total Protein 12/08/2023 8.2     Albumin 12/08/2023 4.8     Total Bilirubin 12/08/2023 1.20 (H)     eGFR 12/08/2023 127     Lipase 12/08/2023 11     Color, UA 12/08/2023 Yellow     Clarity, UA 12/08/2023 Extra Turbid     Specific Gravity, UA 12/08/2023 1.025     pH, UA 12/08/2023 7.0     Leukocytes, UA 12/08/2023 Negative     Nitrite, UA 12/08/2023 Negative     Protein, UA 12/08/2023 Trace (A)     Glucose, UA 12/08/2023 Negative     Ketones, UA 12/08/2023 Negative     Urobilinogen, UA 12/08/2023 <2.0     Bilirubin, UA 12/08/2023 Negative     Occult Blood, UA 12/08/2023 Negative     Preg, Serum 12/08/2023 Negative     TACROLIMUS 12/08/2023 15.0     RBC, UA 12/08/2023 4-10 (A)     WBC, UA 12/08/2023 1-2     Epithelial Cells 12/08/2023 Occasional     Bacteria, UA 12/08/2023 None Seen     MUCUS THREADS 12/08/2023 Moderate (A)     Budding Yeast 12/08/2023 Present          Radiology Results:   No results found.      Answers submitted by the patient for this visit:  Abdominal Pain Questionnaire (Submitted on 1/2/2024)  Chief Complaint: Abdominal pain  Chronicity: chronic  Onset: more than 1 month ago  Onset quality: sudden  Frequency: constantly  Episode duration: 24 Hours  Progression since onset: unchanged  Pain location: generalized abdominal region  Pain - numeric: 8/10  Pain quality: sharp  Radiates to: LLQ, LUQ, RLQ, RUQ, epigastric region, suprapubic region, left shoulder, right shoulder, chest, back, left flank, right flank  anorexia: No  arthralgias: Yes  belching: No  constipation: No  diarrhea: Yes  dysuria: Yes  fever: No  flatus: No  frequency: No  headaches: Yes  hematochezia: No  hematuria: No  melena: No  myalgias: Yes  nausea: Yes  weight loss: Yes  vomiting: No  Aggravated by: being still, bowel movement, certain positions, coughing, deep breathing, eating, movement  Relieved by: nothing  Diagnostic workup: CT scan

## 2024-01-09 NOTE — PATIENT INSTRUCTIONS
Scheduled date of EGD(as of today):  1/23/24  Physician performing EGD: Dr. Teague  Location of EGD: [place]   Instructions reviewed with patient by: Mariza   Clearances:   n/a

## 2024-01-23 ENCOUNTER — HOSPITAL ENCOUNTER (OUTPATIENT)
Dept: GASTROENTEROLOGY | Facility: HOSPITAL | Age: 24
Setting detail: OUTPATIENT SURGERY
Discharge: HOME | End: 2024-01-23
Attending: INTERNAL MEDICINE
Payer: COMMERCIAL

## 2024-01-23 ENCOUNTER — ANESTHESIA EVENT (OUTPATIENT)
Dept: GASTROENTEROLOGY | Facility: HOSPITAL | Age: 24
End: 2024-01-23

## 2024-01-23 ENCOUNTER — ANESTHESIA (OUTPATIENT)
Dept: GASTROENTEROLOGY | Facility: HOSPITAL | Age: 24
End: 2024-01-23

## 2024-01-23 VITALS
DIASTOLIC BLOOD PRESSURE: 51 MMHG | HEART RATE: 80 BPM | BODY MASS INDEX: 27.27 KG/M2 | SYSTOLIC BLOOD PRESSURE: 83 MMHG | TEMPERATURE: 98.3 F | OXYGEN SATURATION: 100 % | RESPIRATION RATE: 14 BRPM | WEIGHT: 138.89 LBS | HEIGHT: 60 IN

## 2024-01-23 DIAGNOSIS — R10.84 GENERALIZED ABDOMINAL PAIN: ICD-10-CM

## 2024-01-23 LAB
EXT PREGNANCY TEST URINE: NEGATIVE
EXT. CONTROL: NORMAL

## 2024-01-23 PROCEDURE — 81025 URINE PREGNANCY TEST: CPT | Performed by: ANESTHESIOLOGY

## 2024-01-23 PROCEDURE — 88305 TISSUE EXAM BY PATHOLOGIST: CPT | Performed by: PATHOLOGY

## 2024-01-23 PROCEDURE — 43239 EGD BIOPSY SINGLE/MULTIPLE: CPT | Performed by: INTERNAL MEDICINE

## 2024-01-23 RX ORDER — FAMOTIDINE 10 MG
10 TABLET ORAL 2 TIMES DAILY
COMMUNITY

## 2024-01-23 RX ORDER — PROPOFOL 10 MG/ML
INJECTION, EMULSION INTRAVENOUS AS NEEDED
Status: DISCONTINUED | OUTPATIENT
Start: 2024-01-23 | End: 2024-01-23

## 2024-01-23 RX ORDER — SODIUM CHLORIDE, SODIUM LACTATE, POTASSIUM CHLORIDE, CALCIUM CHLORIDE 600; 310; 30; 20 MG/100ML; MG/100ML; MG/100ML; MG/100ML
INJECTION, SOLUTION INTRAVENOUS CONTINUOUS PRN
Status: DISCONTINUED | OUTPATIENT
Start: 2024-01-23 | End: 2024-01-23

## 2024-01-23 RX ORDER — LIDOCAINE HYDROCHLORIDE 20 MG/ML
INJECTION, SOLUTION EPIDURAL; INFILTRATION; INTRACAUDAL; PERINEURAL AS NEEDED
Status: DISCONTINUED | OUTPATIENT
Start: 2024-01-23 | End: 2024-01-23

## 2024-01-23 RX ADMIN — PROPOFOL 100 MG: 10 INJECTION, EMULSION INTRAVENOUS at 08:57

## 2024-01-23 RX ADMIN — PROPOFOL 100 MG: 10 INJECTION, EMULSION INTRAVENOUS at 08:54

## 2024-01-23 RX ADMIN — SODIUM CHLORIDE, SODIUM LACTATE, POTASSIUM CHLORIDE, AND CALCIUM CHLORIDE: .6; .31; .03; .02 INJECTION, SOLUTION INTRAVENOUS at 08:47

## 2024-01-23 RX ADMIN — LIDOCAINE HYDROCHLORIDE 50 MG: 20 INJECTION, SOLUTION EPIDURAL; INFILTRATION; INTRACAUDAL at 08:54

## 2024-01-23 RX ADMIN — SODIUM CHLORIDE, SODIUM LACTATE, POTASSIUM CHLORIDE, AND CALCIUM CHLORIDE: .6; .31; .03; .02 INJECTION, SOLUTION INTRAVENOUS at 08:54

## 2024-01-23 NOTE — H&P
History and Physical -  [facility]  Domonique Rivers 23 y.o. female MRN: [id number]      HPI: Domonique Rivers is a 23 y.o. year old female who presents for evaluation of abdominal pain      REVIEW OF SYSTEMS: Per the HPI, and otherwise unremarkable.    Historical Information   Past Medical History:   Diagnosis Date    Anxiety     Biliary atresia     Migraines     Portal vein thrombosis      Past Surgical History:   Procedure Laterality Date    CHOLECYSTECTOMY      removed with transplant    HEPATITIS B SURFACE AB QN,LIVER TRANSPLANT (HISTORICAL)      LIVER SURGERY      transplant     Social History   Social History     Substance and Sexual Activity   Alcohol Use Yes    Comment: occ     Social History     Substance and Sexual Activity   Drug Use Never     Social History     Tobacco Use   Smoking Status Never   Smokeless Tobacco Never     History reviewed. No pertinent family history.    Meds/Allergies     (Not in a hospital admission)      No Known Allergies    Objective     Blood pressure 143/63, pulse 96, temperature 98.5 °F (36.9 °C), temperature source Temporal, resp. rate 22, height 5' (1.524 m), weight 63 kg (138 lb 14.2 oz), last menstrual period 01/12/2024, SpO2 100%.      PHYSICAL EXAM    Gen: NAD  CV: RRR  CHEST: Clear  ABD: soft, NT/ND  EXT: no edema      ASSESSMENT/PLAN:  This is a 23 y.o. year old female here for EGD with biopsies, and she is stable and optimized for her procedure.

## 2024-01-23 NOTE — ANESTHESIA PREPROCEDURE EVALUATION
Procedure:  EGD    Relevant Problems   No relevant active problems      Biliary atresia    Portal vein thrombosis    Migraines    Anxiety        S/p liver txp      Physical Exam    Airway    Mallampati score: I  TM Distance: >3 FB  Neck ROM: full     Dental       Cardiovascular  Cardiovascular exam normal    Pulmonary  Pulmonary exam normal     Other Findings  post-pubertal.      Anesthesia Plan  ASA Score- 2     Anesthesia Type- IV sedation with anesthesia with ASA Monitors.         Additional Monitors:     Airway Plan:            Plan Factors-Exercise tolerance (METS): >4 METS.    Chart reviewed. EKG reviewed. Imaging results reviewed. Existing labs reviewed. Patient summary reviewed.                  Induction- intravenous.    Postoperative Plan-     Informed Consent- Anesthetic plan and risks discussed with patient.  I personally reviewed this patient with the CRNA. Discussed and agreed on the Anesthesia Plan with the CRNA..

## 2024-01-23 NOTE — ANESTHESIA POSTPROCEDURE EVALUATION
Post-Op Assessment Note    CV Status:  Stable  Pain Score: 0    Pain management: adequate       Mental Status:  Alert and awake   Hydration Status:  Euvolemic   PONV Controlled:  Controlled   Airway Patency:  Patent     Post Op Vitals Reviewed: Yes    No anethesia notable event occurred.    Staff: CRNA               BP (!) 88/54 (01/23/24 0906)    Temp 98.3 °F (36.8 °C) (01/23/24 0906)    Pulse 71 (01/23/24 0906)   Resp 20 (01/23/24 0906)    SpO2 99 % (01/23/24 0906)

## 2024-01-24 PROCEDURE — 88305 TISSUE EXAM BY PATHOLOGIST: CPT | Performed by: PATHOLOGY

## 2024-01-26 DIAGNOSIS — Z94.4 S/P LIVER TRANSPLANT (HCC): Primary | ICD-10-CM

## 2024-01-26 RX ORDER — TACROLIMUS 1 MG/1
2 CAPSULE ORAL EVERY 12 HOURS SCHEDULED
Qty: 360 CAPSULE | Refills: 3 | Status: SHIPPED | OUTPATIENT
Start: 2024-01-26 | End: 2025-01-25

## 2024-01-29 ENCOUNTER — TELEPHONE (OUTPATIENT)
Dept: GASTROENTEROLOGY | Facility: CLINIC | Age: 24
End: 2024-01-29

## 2024-01-29 DIAGNOSIS — R93.5 ABNORMAL FINDINGS ON DIAGNOSTIC IMAGING OF ABDOMEN: ICD-10-CM

## 2024-01-29 DIAGNOSIS — R11.0 NAUSEA: ICD-10-CM

## 2024-01-29 DIAGNOSIS — R10.84 GENERALIZED ABDOMINAL PAIN: Primary | ICD-10-CM

## 2024-01-29 NOTE — TELEPHONE ENCOUNTER
Patient will complete ordered labs. She is aware not to take AM dose  of tacrolimus in order to get true trough level.

## 2024-02-09 RX ORDER — ESCITALOPRAM OXALATE 5 MG/1
5 TABLET ORAL DAILY
Refills: 0 | OUTPATIENT
Start: 2024-02-09

## 2024-02-09 NOTE — TELEPHONE ENCOUNTER
Lm for pt that we received the message that refill was denied and that we do not prescribe the Lexapro.  If tae has any questions, she is to call the office.

## 2024-02-24 ENCOUNTER — APPOINTMENT (OUTPATIENT)
Dept: LAB | Facility: HOSPITAL | Age: 24
End: 2024-02-24
Payer: COMMERCIAL

## 2024-02-24 DIAGNOSIS — Z94.4 S/P LIVER TRANSPLANT (HCC): ICD-10-CM

## 2024-02-24 LAB
ALBUMIN SERPL BCP-MCNC: 4.9 G/DL (ref 3.5–5)
ALP SERPL-CCNC: 74 U/L (ref 34–104)
ALT SERPL W P-5'-P-CCNC: 16 U/L (ref 7–52)
ANION GAP SERPL CALCULATED.3IONS-SCNC: 8 MMOL/L
AST SERPL W P-5'-P-CCNC: 18 U/L (ref 13–39)
BILIRUB SERPL-MCNC: 1.11 MG/DL (ref 0.2–1)
BUN SERPL-MCNC: 13 MG/DL (ref 5–25)
CALCIUM SERPL-MCNC: 10.2 MG/DL (ref 8.4–10.2)
CHLORIDE SERPL-SCNC: 101 MMOL/L (ref 96–108)
CO2 SERPL-SCNC: 31 MMOL/L (ref 21–32)
CREAT SERPL-MCNC: 0.62 MG/DL (ref 0.6–1.3)
ERYTHROCYTE [DISTWIDTH] IN BLOOD BY AUTOMATED COUNT: 14.6 % (ref 11.6–15.1)
GFR SERPL CREATININE-BSD FRML MDRD: 128 ML/MIN/1.73SQ M
GGT SERPL-CCNC: 12 U/L (ref 9–64)
GLUCOSE SERPL-MCNC: 81 MG/DL (ref 65–140)
HCT VFR BLD AUTO: 40.8 % (ref 34.8–46.1)
HGB BLD-MCNC: 13.8 G/DL (ref 11.5–15.4)
INR PPP: 0.88 (ref 0.84–1.19)
MCH RBC QN AUTO: 29.1 PG (ref 26.8–34.3)
MCHC RBC AUTO-ENTMCNC: 33.8 G/DL (ref 31.4–37.4)
MCV RBC AUTO: 86 FL (ref 82–98)
PLATELET # BLD AUTO: 259 THOUSANDS/UL (ref 149–390)
PMV BLD AUTO: 9.7 FL (ref 8.9–12.7)
POTASSIUM SERPL-SCNC: 3.6 MMOL/L (ref 3.5–5.3)
PROT SERPL-MCNC: 8.3 G/DL (ref 6.4–8.4)
PROTHROMBIN TIME: 12.5 SECONDS (ref 11.6–14.5)
RBC # BLD AUTO: 4.75 MILLION/UL (ref 3.81–5.12)
SODIUM SERPL-SCNC: 140 MMOL/L (ref 135–147)
WBC # BLD AUTO: 6.36 THOUSAND/UL (ref 4.31–10.16)

## 2024-02-24 PROCEDURE — 80197 ASSAY OF TACROLIMUS: CPT

## 2024-02-24 PROCEDURE — 36415 COLL VENOUS BLD VENIPUNCTURE: CPT

## 2024-02-24 PROCEDURE — 85610 PROTHROMBIN TIME: CPT

## 2024-02-24 PROCEDURE — 82977 ASSAY OF GGT: CPT

## 2024-02-24 PROCEDURE — 80053 COMPREHEN METABOLIC PANEL: CPT

## 2024-02-24 PROCEDURE — 85027 COMPLETE CBC AUTOMATED: CPT

## 2024-02-25 LAB — TACROLIMUS BLD-MCNC: 8.2 NG/ML (ref 3–15)

## 2024-02-26 DIAGNOSIS — Z94.4 S/P LIVER TRANSPLANT (HCC): ICD-10-CM

## 2024-02-26 RX ORDER — TACROLIMUS 1 MG/1
1 CAPSULE ORAL EVERY 12 HOURS SCHEDULED
Qty: 180 CAPSULE | Refills: 0 | Status: SHIPPED | OUTPATIENT
Start: 2024-02-26 | End: 2024-05-26

## 2024-02-26 NOTE — TELEPHONE ENCOUNTER
See 2/24/24 result note-    Patient confirmed she held Tacrolimus 12 hours prior to 2/24 labs for accurate trough level. She will decrease tacrolimus to 1 mg BID and repeat labs in one week from dose adjustment. Patient requested updated script for Tacrolimus.

## 2024-03-06 ENCOUNTER — HOSPITAL ENCOUNTER (OUTPATIENT)
Dept: NUCLEAR MEDICINE | Facility: HOSPITAL | Age: 24
Discharge: HOME | End: 2024-03-06
Attending: INTERNAL MEDICINE

## 2024-03-06 DIAGNOSIS — R11.0 NAUSEA: ICD-10-CM

## 2024-03-06 DIAGNOSIS — R93.5 ABNORMAL FINDINGS ON DIAGNOSTIC IMAGING OF ABDOMEN: ICD-10-CM

## 2024-03-06 DIAGNOSIS — R10.84 GENERALIZED ABDOMINAL PAIN: ICD-10-CM

## 2024-03-06 PROCEDURE — A9502 TC99M TETROFOSMIN: HCPCS

## 2024-03-06 PROCEDURE — G1004 CDSM NDSC: HCPCS

## 2024-03-06 PROCEDURE — 78264 GASTRIC EMPTYING IMG STUDY: CPT

## 2024-05-01 DIAGNOSIS — Z94.4 S/P LIVER TRANSPLANT (HCC): ICD-10-CM

## 2024-05-02 RX ORDER — TACROLIMUS 1 MG/1
1 CAPSULE ORAL EVERY 12 HOURS SCHEDULED
Qty: 180 CAPSULE | Refills: 0 | Status: SHIPPED | OUTPATIENT
Start: 2024-05-02 | End: 2024-07-31

## 2024-05-02 NOTE — TELEPHONE ENCOUNTER
Pt called in stating she wants her tacrolimus (PROGRAF) 1 mg capsule sent to [facility]/pharmacy #[id number] - [facility] [place], [place] - [address] updated the request. She is also out of the script as she took her last one yesterday. Will route HP.

## 2024-05-17 ENCOUNTER — TELEPHONE (OUTPATIENT)
Dept: GASTROENTEROLOGY | Facility: CLINIC | Age: 24
End: 2024-05-17

## 2024-05-17 NOTE — TELEPHONE ENCOUNTER
Spoke to patient, printed out and scanned insurance card into epic. As per pt's request, terminated [employer]. Also told patient that at her ov on 7/1/24 with ALEC Canas to bring in her [employer] insurance card, so that we can scan a better copy of the insurance card. Pt verbalized understanding.

## 2024-06-02 ENCOUNTER — OFFICE VISIT (OUTPATIENT)
Dept: URGENT CARE | Facility: MEDICAL CENTER | Age: 24
End: 2024-06-02
Payer: COMMERCIAL

## 2024-06-02 VITALS
RESPIRATION RATE: 18 BRPM | SYSTOLIC BLOOD PRESSURE: 123 MMHG | HEART RATE: 88 BPM | TEMPERATURE: 98 F | OXYGEN SATURATION: 99 % | DIASTOLIC BLOOD PRESSURE: 72 MMHG

## 2024-06-02 DIAGNOSIS — R30.0 DYSURIA: Primary | ICD-10-CM

## 2024-06-02 LAB
SL AMB  POCT GLUCOSE, UA: ABNORMAL
SL AMB LEUKOCYTE ESTERASE,UA: ABNORMAL
SL AMB POCT BILIRUBIN,UA: ABNORMAL
SL AMB POCT BLOOD,UA: ABNORMAL
SL AMB POCT CLARITY,UA: ABNORMAL
SL AMB POCT COLOR,UA: YELLOW
SL AMB POCT KETONES,UA: ABNORMAL
SL AMB POCT NITRITE,UA: ABNORMAL
SL AMB POCT PH,UA: 8
SL AMB POCT SPECIFIC GRAVITY,UA: 1.01
SL AMB POCT URINE PROTEIN: 100
SL AMB POCT UROBILINOGEN: ABNORMAL

## 2024-06-02 PROCEDURE — 87077 CULTURE AEROBIC IDENTIFY: CPT | Performed by: PHYSICIAN ASSISTANT

## 2024-06-02 PROCEDURE — G0383 LEV 4 HOSP TYPE B ED VISIT: HCPCS | Performed by: PHYSICIAN ASSISTANT

## 2024-06-02 PROCEDURE — S9083 URGENT CARE CENTER GLOBAL: HCPCS | Performed by: PHYSICIAN ASSISTANT

## 2024-06-02 PROCEDURE — 81002 URINALYSIS NONAUTO W/O SCOPE: CPT | Performed by: PHYSICIAN ASSISTANT

## 2024-06-02 PROCEDURE — 87086 URINE CULTURE/COLONY COUNT: CPT | Performed by: PHYSICIAN ASSISTANT

## 2024-06-02 RX ORDER — NITROFURANTOIN 25; 75 MG/1; MG/1
100 CAPSULE ORAL 2 TIMES DAILY
Qty: 14 CAPSULE | Refills: 0 | Status: SHIPPED | OUTPATIENT
Start: 2024-06-02 | End: 2024-06-09

## 2024-06-02 NOTE — PATIENT INSTRUCTIONS
Dysuria  Macrobid as directed  Follow up with PCP in 3-5 days.  Proceed to  ER if symptoms worsen.

## 2024-06-02 NOTE — PROGRESS NOTES
[facility]        NAME: Domonique Rivers is a 23 y.o. female  : 2000    MRN: [id number]  DATE: 2024  TIME: 3:32 PM    Assessment and Plan   Dysuria [R30.0]  1. Dysuria  POCT urine dip    Urine culture    Urine culture    nitrofurantoin (MACROBID) 100 mg capsule            Patient Instructions     Dysuria  Macrobid as directed  Follow up with PCP in 3-5 days.  Proceed to  ER if symptoms worsen.    Chief Complaint     Chief Complaint   Patient presents with    Female Dysuria     Patient has urinary burning, pressure, frequency ongoing since Monday          History of Present Illness       23-year-old female who presents complaining of frequency, urgency, dysuria x 3 days.  Denies fevers, chills, abdominal pain, nausea, vomiting.        Review of Systems   Review of Systems   Constitutional: Negative.    HENT: Negative.     Eyes: Negative.    Respiratory: Negative.  Negative for cough, chest tightness, shortness of breath, wheezing and stridor.    Cardiovascular: Negative.  Negative for chest pain, palpitations and leg swelling.   Gastrointestinal: Negative.    Genitourinary:  Positive for dysuria, frequency and urgency. Negative for decreased urine volume, difficulty urinating, dyspareunia, enuresis, flank pain, genital sores, hematuria, menstrual problem, pelvic pain, vaginal bleeding, vaginal discharge and vaginal pain.         Current Medications       Current Outpatient Medications:     nitrofurantoin (MACROBID) 100 mg capsule, Take 1 capsule (100 mg total) by mouth 2 (two) times a day for 7 days, Disp: 14 capsule, Rfl: 0    calcipotriene (DOVONEX) 0.005 % topical solution, Apply 1-2 drops to psoriasis areas nightly as needed., Disp: , Rfl:     clobetasol (TEMOVATE) 0.05 % ointment, apply A THN LAYER TO PINK SCALY PATCHES TWICE DAILY FOR 2 TO 7 DA...  (REFER TO PRESCRIPTION NOTES)., Disp: , Rfl:     dicyclomine (BENTYL) 20 mg tablet, Take 1 tablet (20 mg total) by mouth 2 (two) times a  day (Patient not taking: Reported on 1/9/2024), Disp: 20 tablet, Rfl: 0    escitalopram (LEXAPRO) 5 mg tablet, Take 5 mg by mouth daily, Disp: , Rfl:     famotidine (PEPCID) 10 mg tablet, Take 10 mg by mouth 2 (two) times a day, Disp: , Rfl:     gabapentin (Neurontin) 100 mg capsule, Take 1 capsule (100 mg total) by mouth 3 (three) times a day (Patient not taking: Reported on 1/9/2024), Disp: 60 capsule, Rfl: 3    ondansetron (Zofran ODT) 4 mg disintegrating tablet, Take 1 tablet (4 mg total) by mouth every 6 (six) hours as needed for nausea or vomiting (Patient not taking: Reported on 1/9/2024), Disp: 20 tablet, Rfl: 0    ondansetron (ZOFRAN-ODT) 4 mg disintegrating tablet, Take 1 tablet (4 mg total) by mouth every 8 (eight) hours as needed for nausea, Disp: 20 tablet, Rfl: 0    sucralfate (CARAFATE) 1 g/10 mL suspension, Take 10 mL (1 g total) by mouth 4 (four) times a day (Patient not taking: Reported on 1/9/2024), Disp: 414 mL, Rfl: 0    tacrolimus (PROGRAF) 1 mg capsule, Take 1 capsule (1 mg total) by mouth every 12 (twelve) hours, Disp: 180 capsule, Rfl: 0    Current Allergies     Allergies as of 06/02/2024    (No Known Allergies)            The following portions of the patient's history were reviewed and updated as appropriate: allergies, current medications, past family history, past medical history, past social history, past surgical history and problem list.     Past Medical History:   Diagnosis Date    Anxiety     Biliary atresia     Migraines     Portal vein thrombosis        Past Surgical History:   Procedure Laterality Date    CHOLECYSTECTOMY      removed with transplant    HEPATITIS B SURFACE AB QN,LIVER TRANSPLANT (HISTORICAL)      LIVER SURGERY      transplant       No family history on file.      Medications have been verified.        Objective   /72   Pulse 88   Temp 98 °F (36.7 °C)   Resp 18   SpO2 99%        Physical Exam     Physical Exam  Constitutional:       Appearance: She is  well-developed.   HENT:      Head: Normocephalic and atraumatic.      Right Ear: External ear normal.      Left Ear: External ear normal.      Nose: Nose normal.      Mouth/Throat:      Mouth: Oropharynx is clear and moist.      Pharynx: No oropharyngeal exudate.   Cardiovascular:      Rate and Rhythm: Normal rate and regular rhythm.      Heart sounds: Normal heart sounds.   Pulmonary:      Effort: Pulmonary effort is normal. No respiratory distress.      Breath sounds: Normal breath sounds. No wheezing or rales.   Chest:      Chest wall: No tenderness.   Abdominal:      General: Bowel sounds are normal. There is no distension.      Palpations: Abdomen is soft. There is no mass.      Tenderness: There is no abdominal tenderness. There is no right CVA tenderness, left CVA tenderness, guarding or rebound.   Musculoskeletal:      Cervical back: Normal range of motion and neck supple.   Lymphadenopathy:      Cervical: No cervical adenopathy.

## 2024-06-05 LAB
BACTERIA UR CULT: ABNORMAL
BACTERIA UR CULT: ABNORMAL

## 2024-06-20 ENCOUNTER — OFFICE VISIT (OUTPATIENT)
Dept: INTERNAL MEDICINE CLINIC | Facility: OTHER | Age: 24
End: 2024-06-20
Payer: COMMERCIAL

## 2024-06-20 VITALS
DIASTOLIC BLOOD PRESSURE: 80 MMHG | OXYGEN SATURATION: 98 % | HEIGHT: 60 IN | BODY MASS INDEX: 26.66 KG/M2 | TEMPERATURE: 98.9 F | SYSTOLIC BLOOD PRESSURE: 110 MMHG | WEIGHT: 135.8 LBS | HEART RATE: 90 BPM

## 2024-06-20 DIAGNOSIS — T16.2XXA FOREIGN BODY OF LEFT EAR, INITIAL ENCOUNTER: ICD-10-CM

## 2024-06-20 DIAGNOSIS — F81.0 BASIC LEARNING DISABILITY, READING: ICD-10-CM

## 2024-06-20 DIAGNOSIS — Q44.2 CONGENITAL BILIARY ATRESIA: ICD-10-CM

## 2024-06-20 DIAGNOSIS — D84.9 IMMUNOSUPPRESSED STATUS (HCC): ICD-10-CM

## 2024-06-20 DIAGNOSIS — Z13.220 SCREENING FOR LIPID DISORDERS: ICD-10-CM

## 2024-06-20 DIAGNOSIS — L40.9 PSORIASIS: ICD-10-CM

## 2024-06-20 DIAGNOSIS — H92.02 LEFT EAR PAIN: ICD-10-CM

## 2024-06-20 DIAGNOSIS — Z76.89 ENCOUNTER TO ESTABLISH CARE: ICD-10-CM

## 2024-06-20 DIAGNOSIS — G43.009 MIGRAINE WITHOUT AURA AND WITHOUT STATUS MIGRAINOSUS, NOT INTRACTABLE: Primary | ICD-10-CM

## 2024-06-20 DIAGNOSIS — Z94.4 HISTORY OF LIVER TRANSPLANT (HCC): ICD-10-CM

## 2024-06-20 PROBLEM — J30.2 SEASONAL ALLERGIES: Status: ACTIVE | Noted: 2022-09-20

## 2024-06-20 PROBLEM — H54.7 VISION IMPAIRMENT: Status: ACTIVE | Noted: 2018-12-10

## 2024-06-20 PROBLEM — Z78.9 MEDICALLY COMPLEX PATIENT: Status: ACTIVE | Noted: 2019-05-17

## 2024-06-20 PROBLEM — Z87.442 HISTORY OF KIDNEY STONES: Status: ACTIVE | Noted: 2019-12-17

## 2024-06-20 PROCEDURE — 99204 OFFICE O/P NEW MOD 45 MIN: CPT | Performed by: NURSE PRACTITIONER

## 2024-06-20 NOTE — ASSESSMENT & PLAN NOTE
Due to congenital biliary atresia   Age 5 transplant   On tacrolimus bid   Managed by [facility] hepatologist Dr Hanks

## 2024-06-20 NOTE — ASSESSMENT & PLAN NOTE
Medical hx reviewed with patient and mother  Check labs as ordered  Follow up 1 month for physical

## 2024-06-20 NOTE — ASSESSMENT & PLAN NOTE
- hx of reading and comprehension disability  - finishing up her degree at [employer] in [employer] biology

## 2024-06-20 NOTE — ASSESSMENT & PLAN NOTE
C/o left ear pain for 12 hours  On exam, bright green foreign body seen in canal. Evaluated with Dr Ladd  Placed referred to ENT

## 2024-06-20 NOTE — ASSESSMENT & PLAN NOTE
- s/p full liver transplant at age 5  - followed by hepatologist Dr Hanks with [facility]

## 2024-06-20 NOTE — PROGRESS NOTES
Assessment/Plan:    Problem List Items Addressed This Visit       Basic learning disability, reading     - hx of reading and comprehension disability  - finishing up her degree at [employer] in [place]         Congenital biliary atresia     - s/p full liver transplant at age 5  - followed by hepatologist Dr Hanks with [facility]          History of liver transplant (HCC)     Due to congenital biliary atresia   Age 5 transplant   On tacrolimus bid   Managed by [facility] hepatologist Dr Hanks            Immunosuppressed status (HCC)     - tacrolimus bid for hx of liver transplant          Migraine without aura and without status migrainosus, not intractable - Primary     - controlled with OTC ibuprofen, occurs on 1-2x per month         Psoriasis     - currently on calcipotriene and clobetasol  - referral given to dermatology          Relevant Orders    Ambulatory Referral to Dermatology    Encounter to establish care     Medical hx reviewed with patient and mother  Check labs as ordered  Follow up 1 month for physical          Foreign body of left ear     C/o left ear pain for 12 hours  On exam, bright green foreign body seen in canal. Evaluated with Dr Ladd  Placed referred to ENT          Relevant Orders    Ambulatory Referral to Otolaryngology    Left ear pain     C/o left ear pain for 12 hours  On exam, bright green foreign body seen in canal. Evaluated with Dr Ladd  Placed referred to ENT          Relevant Orders    Ambulatory Referral to Otolaryngology     Other Visit Diagnoses       Screening for lipid disorders        Relevant Orders    Lipid Panel with Direct LDL reflex              M*Tujia software was used to dictate this note.  It may contain errors with dictating incorrect words or incorrect spelling. Please contact the provider directly with any questions.    Subjective:      Patient ID: Domonique Rivers is a 23 y.o. female.    HPI    Patient presents today accompanied by her mom.   She is  here to establish care with our office  She was previously following with [facility] but last saw them several years ago  She follows with a liver specialist and GI doctor   Dr Hanks is her liver specialist  Dr Orantes is her GI     She is finishing her studies at [employer] in [employer] biology     PMH -     Migraines - without aura. Dx at age 6. Controlled on ibuprofen. Occur about 1-2x a month    Hx of congenital biliary atresia - at 3 weeks old she had a surgical procedure. first surgery was 10 months ago, mother donated part of her liver. Second surgery was age 5, total liver transplant. She followed all of her life with [facility] and then aged out. She now follows with Dr Hanks with [facility]. She is on tacrolimus bid every 6 months     Psoriasis - she was previously following with dermatology. She is currently on calcipotriene and clobetasol. She has lesions in her ears.     Reading/comprehension learning disability    Hx of kidney stones - passed 2 stones in high school     Seasonal allergies - controlled with OTC antihistamines     Today she is experencing left ear pain. Started last [facility].     The following portions of the patient's history were reviewed and updated as appropriate: allergies, current medications, past family history, past medical history, past social history, past surgical history, and problem list.    Review of Systems   Constitutional:  Negative for chills and fever.   HENT:  Positive for congestion and ear pain. Negative for ear discharge, rhinorrhea, sinus pressure, sinus pain and sore throat (resolved 2 days ago).    Respiratory:  Negative for cough, chest tightness, shortness of breath and wheezing.    Cardiovascular:  Negative for chest pain.         Past Medical History:   Diagnosis Date    Anxiety     Biliary atresia     Migraines     Portal vein thrombosis          Current Outpatient Medications:     calcipotriene (DOVONEX) 0.005 % topical solution, Apply  1-2 drops to psoriasis areas nightly as needed., Disp: , Rfl:     clobetasol (TEMOVATE) 0.05 % ointment, apply A THN LAYER TO PINK SCALY PATCHES TWICE DAILY FOR 2 TO 7 DA...  (REFER TO PRESCRIPTION NOTES)., Disp: , Rfl:     ondansetron (ZOFRAN-ODT) 4 mg disintegrating tablet, Take 1 tablet (4 mg total) by mouth every 8 (eight) hours as needed for nausea, Disp: 20 tablet, Rfl: 0    tacrolimus (PROGRAF) 1 mg capsule, Take 1 capsule (1 mg total) by mouth every 12 (twelve) hours, Disp: 180 capsule, Rfl: 0    No Known Allergies    Social History   Past Surgical History:   Procedure Laterality Date    CHOLECYSTECTOMY      removed with transplant    HEPATITIS B SURFACE AB QN,LIVER TRANSPLANT (HISTORICAL)      LIVER SURGERY      transplant     History reviewed. No pertinent family history.    Objective:  /80 (BP Location: Left arm, Patient Position: Sitting, Cuff Size: Standard)   Pulse 90   Temp 98.9 °F (37.2 °C) (Temporal)   Ht 5' (1.524 m)   Wt 61.6 kg (135 lb 12.8 oz)   SpO2 98%   BMI 26.52 kg/m²      Physical Exam  Vitals reviewed.   Constitutional:       General: She is not in acute distress.     Appearance: Normal appearance. She is not diaphoretic.   HENT:      Head: Normocephalic and atraumatic.      Right Ear: Drainage present. There is impacted cerumen.      Left Ear: A foreign body (bright green) is present.   Eyes:      Extraocular Movements: Extraocular movements intact.      Conjunctiva/sclera: Conjunctivae normal.      Pupils: Pupils are equal, round, and reactive to light.   Cardiovascular:      Rate and Rhythm: Normal rate and regular rhythm.      Heart sounds: Normal heart sounds. No murmur heard.  Pulmonary:      Effort: Pulmonary effort is normal. No respiratory distress.      Breath sounds: Normal breath sounds. No wheezing, rhonchi or rales.   Neurological:      Mental Status: She is alert and oriented to person, place, and time. Mental status is at baseline.   Psychiatric:         Mood  and Affect: Mood normal.         Behavior: Behavior normal.         Thought Content: Thought content normal.         Judgment: Judgment normal.

## 2024-06-25 ENCOUNTER — TELEPHONE (OUTPATIENT)
Dept: GASTROENTEROLOGY | Facility: CLINIC | Age: 24
End: 2024-06-25

## 2024-06-28 ENCOUNTER — APPOINTMENT (OUTPATIENT)
Dept: LAB | Facility: IMAGING CENTER | Age: 24
End: 2024-06-28
Payer: COMMERCIAL

## 2024-06-28 DIAGNOSIS — Z13.220 SCREENING FOR LIPID DISORDERS: ICD-10-CM

## 2024-06-28 DIAGNOSIS — Z94.4 S/P LIVER TRANSPLANT (HCC): ICD-10-CM

## 2024-06-28 LAB
ALBUMIN SERPL BCG-MCNC: 4.3 G/DL (ref 3.5–5)
ALP SERPL-CCNC: 67 U/L (ref 34–104)
ALT SERPL W P-5'-P-CCNC: 15 U/L (ref 7–52)
ANION GAP SERPL CALCULATED.3IONS-SCNC: 10 MMOL/L (ref 4–13)
AST SERPL W P-5'-P-CCNC: 16 U/L (ref 13–39)
BILIRUB SERPL-MCNC: 1 MG/DL (ref 0.2–1)
BUN SERPL-MCNC: 9 MG/DL (ref 5–25)
CALCIUM SERPL-MCNC: 9.5 MG/DL (ref 8.4–10.2)
CHLORIDE SERPL-SCNC: 100 MMOL/L (ref 96–108)
CHOLEST SERPL-MCNC: 120 MG/DL
CO2 SERPL-SCNC: 29 MMOL/L (ref 21–32)
CREAT SERPL-MCNC: 0.59 MG/DL (ref 0.6–1.3)
ERYTHROCYTE [DISTWIDTH] IN BLOOD BY AUTOMATED COUNT: 13.9 % (ref 11.6–15.1)
GFR SERPL CREATININE-BSD FRML MDRD: 130 ML/MIN/1.73SQ M
GGT SERPL-CCNC: 11 U/L (ref 9–64)
GLUCOSE P FAST SERPL-MCNC: 89 MG/DL (ref 65–99)
HCT VFR BLD AUTO: 42.4 % (ref 34.8–46.1)
HDLC SERPL-MCNC: 45 MG/DL
HGB BLD-MCNC: 14.6 G/DL (ref 11.5–15.4)
INR PPP: 1.02 (ref 0.84–1.19)
LDLC SERPL CALC-MCNC: 53 MG/DL (ref 0–100)
MCH RBC QN AUTO: 30.5 PG (ref 26.8–34.3)
MCHC RBC AUTO-ENTMCNC: 34.4 G/DL (ref 31.4–37.4)
MCV RBC AUTO: 89 FL (ref 82–98)
PLATELET # BLD AUTO: 263 THOUSANDS/UL (ref 149–390)
PMV BLD AUTO: 10.2 FL (ref 8.9–12.7)
POTASSIUM SERPL-SCNC: 4.1 MMOL/L (ref 3.5–5.3)
PROT SERPL-MCNC: 7.4 G/DL (ref 6.4–8.4)
PROTHROMBIN TIME: 13.3 SECONDS (ref 11.6–14.5)
RBC # BLD AUTO: 4.79 MILLION/UL (ref 3.81–5.12)
SODIUM SERPL-SCNC: 139 MMOL/L (ref 135–147)
TACROLIMUS BLD-MCNC: 3 NG/ML (ref 3–15)
TRIGL SERPL-MCNC: 111 MG/DL
WBC # BLD AUTO: 10.97 THOUSAND/UL (ref 4.31–10.16)

## 2024-06-28 PROCEDURE — 85027 COMPLETE CBC AUTOMATED: CPT

## 2024-06-28 PROCEDURE — 80197 ASSAY OF TACROLIMUS: CPT

## 2024-06-28 PROCEDURE — 85610 PROTHROMBIN TIME: CPT

## 2024-06-28 PROCEDURE — 80053 COMPREHEN METABOLIC PANEL: CPT

## 2024-06-28 PROCEDURE — 36415 COLL VENOUS BLD VENIPUNCTURE: CPT

## 2024-06-28 PROCEDURE — 80061 LIPID PANEL: CPT

## 2024-06-28 PROCEDURE — 82977 ASSAY OF GGT: CPT

## 2024-07-01 ENCOUNTER — OFFICE VISIT (OUTPATIENT)
Dept: GASTROENTEROLOGY | Facility: CLINIC | Age: 24
End: 2024-07-01
Payer: COMMERCIAL

## 2024-07-01 VITALS
BODY MASS INDEX: 26.86 KG/M2 | SYSTOLIC BLOOD PRESSURE: 104 MMHG | DIASTOLIC BLOOD PRESSURE: 70 MMHG | TEMPERATURE: 98.1 F | HEIGHT: 60 IN | WEIGHT: 136.8 LBS

## 2024-07-01 DIAGNOSIS — D84.9 IMMUNOSUPPRESSED STATUS (HCC): ICD-10-CM

## 2024-07-01 DIAGNOSIS — Z94.4 S/P LIVER TRANSPLANT (HCC): Primary | ICD-10-CM

## 2024-07-01 DIAGNOSIS — Z87.738 HISTORY OF BILIARY ATRESIA: ICD-10-CM

## 2024-07-01 PROCEDURE — 99214 OFFICE O/P EST MOD 30 MIN: CPT | Performed by: FAMILY MEDICINE

## 2024-07-01 NOTE — PROGRESS NOTES
[facility] - Outpatient Follow-up Note  Domonique Rivers 23 y.o. female MRN: [id number]  Encounter: [id number]          ASSESSMENT AND PLAN:       1. History of biliary atresia  2. S/P liver transplant (HCC)  3. Immunosuppressed status (HCC)    Summary: Patient is a 23-year-old female with PMH significant for BA s/p OLT x 2 (2002 and 2005). Her initial post-operative course was c/b a portal vein thrombus, hyperammonemia and coagulopathy requiring retransplantation with Rosales-en-Y anastomosis. Her subsequent post-operative course has been c/b hypertension and mild ACR which resolved without the need for steroids. Her last liver biopsy was in August 2019 without evidence of rejection or fibrosis.    Her tacrolimus was decreased to 1 mg twice daily. We reviewed her most recent labs (6/28) which show a tacrolimus level of 3.0 as well as normal liver tests, normal liver synthetic function and preserved platelet count. Additional management as below.    Current immunosuppression: Tacrolimus 1 mg twice daily; Has trialed rapamune in the past     Immunosuppression  - Continue tacrolimus 1 mg twice daily w/ goal of 3.0-5.0  - Repeat labs (CBC, CMP, INR, GGT and tacrolimus level) q3 months  - She is aware to hold her tacrolimus for 12 hours prior to her lab draw for true trough    Renal function  - Stable per review of most recent labs  - Avoid nephrotoxic agents    Skin cancer surveillance  - She is aware of increased risk of de gala malignancy s/p liver transplant including the risk of skin cancer development  - Recommend annual dermatology evaluation for routine skin checks; Referral placed today  - Also recommend the use of daily sunscreen (SPF 25+) and protective clothing    Pap surveillance  - Patient is up-to-date with a PAP smear for cervical cancer surveillance    Bone density surveillance  - Patient has yet to schedule her DEXA scan as ordered; Will assist with scheduling  today  - Continue vitamin D and calcium supplementation    Primary care monitoring  Dyslipidemia: UTD with lipid panel (6/28/2024)  Hypertension: Normotensive in office today  Diabetes: HgA1c ordered today  Weight management: Current BMI 26.72  It is recommended she follow-up with her primary care physician on a regular basis for management of other medical issues and preventative care.    Vaccinations  Beyond post-transplant month six, it is recommend remaining current with influenza, pneumococcus, and Td/DTaP vaccines. Live vaccinations (for example Varicella) are contraindicated and should NOT be administered yet Shingrix is acceptable for a post transplant recipient.     CLINICAL INFORMATION:  Immunosuppression: compliant   New signs or symptoms of CAD?: N  Clinical events related to CAD: N  Post-transplant diabetes: N  Graft Functioning: Yes  Acute Rejection:  N  Post-Transplant Malignancy: N     PHYSICAL STATUS:  Karnofsky Score: 100%  Physical Capacity: No restrictions   Working for Income: Student     - Ambulatory Referral to Dermatology; Future  - CBC and differential; Future  - Comprehensive metabolic panel; Future  - Gamma GT; Future  - Protime-INR; Future  - Tacrolimus level; Future  - Hemoglobin A1C; Future    Follow-up in 6 months or sooner if necessary.   ______________________________________________________________________    SUBJECTIVE: Patient is a 23 y.o. female w/ PMH significant for BS s/p OLT x2 (2002 and 2005) who presents today for follow-up regarding post-transplant care.     Interval history  - Last seen by Dr. Hanks on 10/11/2022  - Seen in consultation by Dr. Orantes for abdominal pain also noted to have possible gastric wall thickening s/p EGD (1/2024) which was grossly unremarkable. Biopsies negative for H. pylori or celiac disease.  - Most recent labs (6/28) with a tacrolimus level of 3.0 as well as normal liver tests, normal liver synthetic function and preserved platelet count.      Extended liver history  Domonique is a 21 year-old female with history of BA s/p OLT x 2 (2002 and then 2005) with initial post-operative course c/b PVT, hyperammonemia and poor growth. She had a mesocaval shunt performed after her first transplant but required retransplant in 2005 with Rosales-en-Y anastomosis for hyperammonemia and progressive malnutrition. Course has been complicated by hypertension and mild ACR which did not require IV steroids. Of note, her last liver biopsy was in in August 2019 which did not show rejection or fibrosis.      She reports intermittent RUQ pain due to adhesive disease. She does have a history of abdominal migraines but has had any symptoms for several years.      She denies excessive EtOH consumption or tobacco abuse.    Complication History:  Rejection: Y  Biliary: N  Hepatic Artery: N  Infections: Y  Metabolic Complications:  Immunosuppression Side Effects: N      REVIEW OF SYSTEMS IS OTHERWISE NEGATIVE.      Historical Information   Past Medical History:   Diagnosis Date   • Anxiety    • Biliary atresia    • Migraines    • Portal vein thrombosis      Past Surgical History:   Procedure Laterality Date   • CHOLECYSTECTOMY      removed with transplant   • HEPATITIS B SURFACE AB QN,LIVER TRANSPLANT (HISTORICAL)     • LIVER SURGERY      transplant     Social History   Social History     Substance and Sexual Activity   Alcohol Use Yes    Comment: occ     Social History     Substance and Sexual Activity   Drug Use Never     Social History     Tobacco Use   Smoking Status Never   Smokeless Tobacco Never     History reviewed. No pertinent family history.    Meds/Allergies       Current Outpatient Medications:   •  calcipotriene (DOVONEX) 0.005 % topical solution  •  clobetasol (TEMOVATE) 0.05 % ointment  •  ondansetron (ZOFRAN-ODT) 4 mg disintegrating tablet  •  tacrolimus (PROGRAF) 1 mg capsule    No Known Allergies        Objective     Blood pressure 104/70, temperature 98.1 °F  (36.7 °C), temperature source Tympanic, height 5' (1.524 m), weight 62.1 kg (136 lb 12.8 oz). Body mass index is 26.72 kg/m².      PHYSICAL EXAM:      General Appearance:   Alert, cooperative, no distress   HEENT:   Normocephalic, atraumatic, anicteric.     Neck:  Supple, symmetrical, trachea midline   Lungs:   Clear to auscultation bilaterally; no rales, rhonchi or wheezing; respirations unlabored    Heart::   Regular rate and rhythm; no murmur, rub, or gallop.   Abdomen:   Soft, non-tender, non-distended; normal bowel sounds; no masses, no organomegaly    Genitalia:   Deferred    Rectal:   Deferred    Extremities:  No cyanosis, clubbing or edema    Pulses:  2+ and symmetric    Skin:  No jaundice, rashes, or lesions    Lymph nodes:  No palpable cervical lymphadenopathy        Lab Results:   No visits with results within 1 Day(s) from this visit.   Latest known visit with results is:   Appointment on 06/28/2024   Component Date Value   • TACROLIMUS 06/28/2024 3.0    • WBC 06/28/2024 10.97 (H)    • RBC 06/28/2024 4.79    • Hemoglobin 06/28/2024 14.6    • Hematocrit 06/28/2024 42.4    • MCV 06/28/2024 89    • MCH 06/28/2024 30.5    • MCHC 06/28/2024 34.4    • RDW 06/28/2024 13.9    • Platelets 06/28/2024 263    • MPV 06/28/2024 10.2    • Sodium 06/28/2024 139    • Potassium 06/28/2024 4.1    • Chloride 06/28/2024 100    • CO2 06/28/2024 29    • ANION GAP 06/28/2024 10    • BUN 06/28/2024 9    • Creatinine 06/28/2024 0.59 (L)    • Glucose, Fasting 06/28/2024 89    • Calcium 06/28/2024 9.5    • AST 06/28/2024 16    • ALT 06/28/2024 15    • Alkaline Phosphatase 06/28/2024 67    • Total Protein 06/28/2024 7.4    • Albumin 06/28/2024 4.3    • Total Bilirubin 06/28/2024 1.00    • eGFR 06/28/2024 130    • Protime 06/28/2024 13.3    • INR 06/28/2024 1.02    • GGT 06/28/2024 11    • Cholesterol 06/28/2024 120    • Triglycerides 06/28/2024 111    • HDL, Direct 06/28/2024 45 (L)    • LDL Calculated 06/28/2024 53           Radiology Results:   No results found.    Clary Canas PA-C     **Please note: Dictation voice to text software may have been used in the creation of this record. Occasional wrong word or “sound alike” substitutions may have occurred due to the inherent limitations of voice recognition software. Read the chart carefully and recognize, using context, where substitutions have occurred.**

## 2024-07-11 DIAGNOSIS — Z94.4 S/P LIVER TRANSPLANT (HCC): ICD-10-CM

## 2024-07-11 RX ORDER — TACROLIMUS 1 MG/1
1 CAPSULE ORAL EVERY 12 HOURS
Qty: 174 CAPSULE | Refills: 3 | Status: SHIPPED | OUTPATIENT
Start: 2024-07-11

## 2024-07-26 ENCOUNTER — RA CDI HCC (OUTPATIENT)
Dept: OTHER | Facility: HOSPITAL | Age: 24
End: 2024-07-26

## 2024-08-07 ENCOUNTER — OFFICE VISIT (OUTPATIENT)
Dept: INTERNAL MEDICINE CLINIC | Facility: OTHER | Age: 24
End: 2024-08-07
Payer: COMMERCIAL

## 2024-08-07 VITALS
SYSTOLIC BLOOD PRESSURE: 110 MMHG | HEART RATE: 88 BPM | HEIGHT: 60 IN | OXYGEN SATURATION: 98 % | TEMPERATURE: 98 F | WEIGHT: 137 LBS | BODY MASS INDEX: 26.9 KG/M2 | DIASTOLIC BLOOD PRESSURE: 62 MMHG

## 2024-08-07 DIAGNOSIS — F41.9 ANXIETY: ICD-10-CM

## 2024-08-07 DIAGNOSIS — Z00.00 ANNUAL PHYSICAL EXAM: Primary | ICD-10-CM

## 2024-08-07 PROBLEM — T16.2XXA FOREIGN BODY OF LEFT EAR: Status: RESOLVED | Noted: 2024-06-20 | Resolved: 2024-08-07

## 2024-08-07 PROBLEM — Z76.89 ENCOUNTER TO ESTABLISH CARE: Status: RESOLVED | Noted: 2024-06-20 | Resolved: 2024-08-07

## 2024-08-07 PROCEDURE — 99395 PREV VISIT EST AGE 18-39: CPT | Performed by: NURSE PRACTITIONER

## 2024-08-07 RX ORDER — ESCITALOPRAM OXALATE 5 MG/1
5 TABLET ORAL DAILY
Qty: 90 TABLET | Refills: 1 | Status: SHIPPED | OUTPATIENT
Start: 2024-08-07

## 2024-08-07 NOTE — ASSESSMENT & PLAN NOTE
23 yr old female  Recommend healthy diet and routine exercise 30 min 5x a week  Recommend Tdap vaccine   Forms completed for permit

## 2024-08-07 NOTE — PATIENT INSTRUCTIONS
"Patient Education     Routine physical for adults   The Basics   Written by the doctors and editors at [facility]   What is a physical? -- A physical is a routine visit, or \"check-up,\" with your doctor. You might also hear it called a \"wellness visit\" or \"preventive visit.\"  During each visit, the doctor will:   Ask about your physical and mental health   Ask about your habits, behaviors, and lifestyle   Do an exam   Give you vaccines if needed   Talk to you about any medicines you take   Give advice about your health   Answer your questions  Getting regular check-ups is an important part of taking care of your health. It can help your doctor find and treat any problems you have. But it's also important for preventing health problems.  A routine physical is different from a \"sick visit.\" A sick visit is when you see a doctor because of a health concern or problem. Since physicals are scheduled ahead of time, you can think about what you want to ask the doctor.  How often should I get a physical? -- It depends on your age and health. In general, for people age 21 years and older:   If you are younger than 50 years, you might be able to get a physical every 3 years.   If you are 50 years or older, your doctor might recommend a physical every year.  If you have an ongoing health condition, like diabetes or high blood pressure, your doctor will probably want to see you more often.  What happens during a physical? -- In general, each visit will include:   Physical exam - The doctor or nurse will check your height, weight, heart rate, and blood pressure. They will also look at your eyes and ears. They will ask about how you are feeling and whether you have any symptoms that bother you.   Medicines - It's a good idea to bring a list of all the medicines you take to each doctor visit. Your doctor will talk to you about your medicines and answer any questions. Tell them if you are having any side effects that bother you. You " "should also tell them if you are having trouble paying for any of your medicines.   Habits and behaviors - This includes:   Your diet   Your exercise habits   Whether you smoke, drink alcohol, or use drugs   Whether you are sexually active   Whether you feel safe at home  Your doctor will talk to you about things you can do to improve your health and lower your risk of health problems. They will also offer help and support. For example, if you want to quit smoking, they can give you advice and might prescribe medicines. If you want to improve your diet or get more physical activity, they can help you with this, too.   Lab tests, if needed - The tests you get will depend on your age and situation. For example, your doctor might want to check your:   Cholesterol   Blood sugar   Iron level   Vaccines - The recommended vaccines will depend on your age, health, and what vaccines you already had. Vaccines are very important because they can prevent certain serious or deadly infections.   Discussion of screening - \"Screening\" means checking for diseases or other health problems before they cause symptoms. Your doctor can recommend screening based on your age, risk, and preferences. This might include tests to check for:   Cancer, such as breast, prostate, cervical, ovarian, colorectal, prostate, lung, or skin cancer   Sexually transmitted infections, such as chlamydia and gonorrhea   Mental health conditions like depression and anxiety  Your doctor will talk to you about the different types of screening tests. They can help you decide which screenings to have. They can also explain what the results might mean.   Answering questions - The physical is a good time to ask the doctor or nurse questions about your health. If needed, they can refer you to other doctors or specialists, too.  Adults older than 65 years often need other care, too. As you get older, your doctor will talk to you about:   How to prevent falling at " home   Hearing or vision tests   Memory testing   How to take your medicines safely   Making sure that you have the help and support you need at home  All topics are updated as new evidence becomes available and our peer review process is complete.  This topic retrieved from [employer] on: May 02, 2024.  Topic 373583 Version 1.0  Release: 32.4.3 - C32.122  © 2024 UpToDate, Inc. and/or its affiliates. All rights reserved.  Consumer Information Use and Disclaimer   Disclaimer: This generalized information is a limited summary of diagnosis, treatment, and/or medication information. It is not meant to be comprehensive and should be used as a tool to help the user understand and/or assess potential diagnostic and treatment options. It does NOT include all information about conditions, treatments, medications, side effects, or risks that may apply to a specific patient. It is not intended to be medical advice or a substitute for the medical advice, diagnosis, or treatment of a health care provider based on the health care provider's examination and assessment of a patient's specific and unique circumstances. Patients must speak with a health care provider for complete information about their health, medical questions, and treatment options, including any risks or benefits regarding use of medications. This information does not endorse any treatments or medications as safe, effective, or approved for treating a specific patient. UpToDate, Inc. and its affiliates disclaim any warranty or liability relating to this information or the use thereof.The use of this information is governed by the Terms of Use, available at https://www.[employer] vascular solutionuwer.com/en/know/clinical-effectiveness-terms. 2024© UpToDate, Inc. and its affiliates and/or licensors. All rights reserved.  Copyright   © 2024 UpToDate, Inc. and/or its affiliates. All rights reserved.

## 2024-08-07 NOTE — PROGRESS NOTES
Adult Annual Physical  Name: Domonique Rivers      : 2000      MRN: [id number]  Encounter Provider: CECILE Scruggs  Encounter Date: 2024   Encounter department: [facility] [place]    Assessment & Plan   1. Annual physical exam  Assessment & Plan:  23 yr old female  Recommend healthy diet and routine exercise 30 min 5x a week  Recommend Tdap vaccine   Forms completed for permit    2. Anxiety  Assessment & Plan:  Restart lexapro 5mg daily  Follow up in 6 months   Orders:  -     escitalopram (LEXAPRO) 5 mg tablet; Take 1 tablet (5 mg total) by mouth daily  -     Comprehensive metabolic panel; Future; Expected date: 2024  Immunizations and preventive care screenings were discussed with patient today. Appropriate education was printed on patient's after visit summary.       History of Present Illness     Adult Annual Physical:  Patient presents for annual physical. Patient presents today for routine physical for her drivers permit.   Her vision screening was 20/80 in the right eye, 20/25 left eye and 20/25 both eyes. She saw her eye dr 3 weeks ago and her prescription changed but she did not bring her new glasses with her today. .     Diet and Physical Activity:  - Diet/Nutrition: well balanced diet.  - Exercise: no formal exercise.    General Health:  - Sleep: sleeps well.  - Hearing: normal hearing bilateral ears.  - Vision: no vision problems.  - Dental: no dental visits for > 1 year.    /GYN Health:  - Follows with GYN: yes.   - Menopause: premenopausal.   - History of STDs: no  - Contraception: barrier methods.      Review of Systems   Constitutional:  Negative for activity change, appetite change, chills, diaphoresis, fever and unexpected weight change.   Eyes:  Negative for visual disturbance.   Respiratory:  Negative for chest tightness, shortness of breath and wheezing.    Cardiovascular:  Negative for chest pain and palpitations.    Gastrointestinal:  Positive for abdominal pain (with anxiety). Negative for blood in stool, constipation, diarrhea, nausea and vomiting.   Genitourinary:  Negative for dysuria, frequency, hematuria and urgency.   Musculoskeletal:  Negative for arthralgias, neck pain and neck stiffness.   Neurological:  Positive for headaches (occasional). Negative for dizziness, seizures, syncope and light-headedness.   Psychiatric/Behavioral:  Negative for dysphoric mood, self-injury, sleep disturbance and suicidal ideas. The patient is nervous/anxious.      Medical History Reviewed by provider this encounter:  Tobacco  Allergies  Meds  Problems  Med Hx  Surg Hx  Fam Hx       Past Medical History   Past Medical History:   Diagnosis Date    Anxiety     Biliary atresia     Migraines     Portal vein thrombosis      Past Surgical History:   Procedure Laterality Date    CHOLECYSTECTOMY      removed with transplant    HEPATITIS B SURFACE AB QN,LIVER TRANSPLANT (HISTORICAL)      LIVER SURGERY      transplant     History reviewed. No pertinent family history.  Current Outpatient Medications on File Prior to Visit   Medication Sig Dispense Refill    calcipotriene (DOVONEX) 0.005 % topical solution Apply 1-2 drops to psoriasis areas nightly as needed.      clobetasol (TEMOVATE) 0.05 % ointment apply A THN LAYER TO PINK SCALY PATCHES TWICE DAILY FOR 2 TO 7 DA...  (REFER TO PRESCRIPTION NOTES).      ondansetron (ZOFRAN-ODT) 4 mg disintegrating tablet Take 1 tablet (4 mg total) by mouth every 8 (eight) hours as needed for nausea 20 tablet 0    tacrolimus (PROGRAF) 1 mg capsule take 1 capsule by mouth every 12 hours 174 capsule 3     No current facility-administered medications on file prior to visit.   No Known Allergies   Current Outpatient Medications on File Prior to Visit   Medication Sig Dispense Refill    calcipotriene (DOVONEX) 0.005 % topical solution Apply 1-2 drops to psoriasis areas nightly as needed.      clobetasol  (TEMOVATE) 0.05 % ointment apply A THN LAYER TO PINK SCALY PATCHES TWICE DAILY FOR 2 TO 7 DA...  (REFER TO PRESCRIPTION NOTES).      ondansetron (ZOFRAN-ODT) 4 mg disintegrating tablet Take 1 tablet (4 mg total) by mouth every 8 (eight) hours as needed for nausea 20 tablet 0    tacrolimus (PROGRAF) 1 mg capsule take 1 capsule by mouth every 12 hours 174 capsule 3     No current facility-administered medications on file prior to visit.      Social History     Tobacco Use    Smoking status: Never    Smokeless tobacco: Never   Vaping Use    Vaping status: Never Used   Substance and Sexual Activity    Alcohol use: Yes     Comment: occ    Drug use: Never    Sexual activity: Yes     Partners: Male     Birth control/protection: None       Objective     /62 (BP Location: Left arm, Patient Position: Sitting, Cuff Size: Standard)   Pulse 88   Temp 98 °F (36.7 °C) (Temporal)   Ht 5' (1.524 m)   Wt 62.1 kg (137 lb)   SpO2 98%   BMI 26.76 kg/m²     Physical Exam  Constitutional:       General: She is not in acute distress.     Appearance: Normal appearance. She is well-developed. She is not diaphoretic.   HENT:      Head: Normocephalic and atraumatic.      Right Ear: Tympanic membrane and external ear normal.      Left Ear: Tympanic membrane normal. There is impacted cerumen.      Nose: Nose normal. No mucosal edema, congestion or rhinorrhea.      Mouth/Throat:      Mouth: Mucous membranes are moist.      Pharynx: Oropharynx is clear. No oropharyngeal exudate or posterior oropharyngeal erythema.   Eyes:      Extraocular Movements: Extraocular movements intact.      Conjunctiva/sclera: Conjunctivae normal.      Pupils: Pupils are equal, round, and reactive to light.   Neck:      Thyroid: No thyromegaly.   Cardiovascular:      Rate and Rhythm: Normal rate and regular rhythm.      Heart sounds: Normal heart sounds. No murmur heard.  Pulmonary:      Effort: Pulmonary effort is normal. No respiratory distress.      Breath  sounds: Normal breath sounds. No decreased breath sounds, wheezing, rhonchi or rales.   Abdominal:      General: Abdomen is flat. Bowel sounds are normal. There is no distension.      Palpations: Abdomen is soft. There is no mass.      Tenderness: There is no abdominal tenderness. There is no guarding.   Musculoskeletal:         General: No tenderness. Normal range of motion.      Cervical back: Normal range of motion and neck supple. No edema.   Lymphadenopathy:      Cervical: No cervical adenopathy.      Upper Body:      Right upper body: No supraclavicular, axillary, pectoral or epitrochlear adenopathy.      Left upper body: No supraclavicular, axillary, pectoral or epitrochlear adenopathy.   Skin:     General: Skin is warm and dry.      Findings: No rash.   Neurological:      Mental Status: She is alert and oriented to person, place, and time. Mental status is at baseline.      Motor: No weakness or abnormal muscle tone.      Gait: Gait normal.      Deep Tendon Reflexes: Reflexes are normal and symmetric. Reflexes normal.   Psychiatric:         Behavior: Behavior normal.         Thought Content: Thought content normal.         Judgment: Judgment normal.

## 2024-08-08 DIAGNOSIS — Z94.4 S/P LIVER TRANSPLANT (HCC): ICD-10-CM

## 2024-08-12 RX ORDER — TACROLIMUS 1 MG/1
1 CAPSULE ORAL EVERY 12 HOURS
Qty: 174 CAPSULE | Refills: 0 | Status: SHIPPED | OUTPATIENT
Start: 2024-08-12

## 2024-09-05 ENCOUNTER — PATIENT OUTREACH (OUTPATIENT)
Dept: CASE MANAGEMENT | Facility: OTHER | Age: 24
End: 2024-09-05

## 2024-09-05 DIAGNOSIS — Z94.4 HISTORY OF LIVER TRANSPLANT (HCC): ICD-10-CM

## 2024-09-05 DIAGNOSIS — Z78.9 MEDICALLY COMPLEX PATIENT: Primary | ICD-10-CM

## 2024-09-05 DIAGNOSIS — D84.9 IMMUNOSUPPRESSED STATUS (HCC): ICD-10-CM

## 2024-09-05 DIAGNOSIS — F81.0 BASIC LEARNING DISABILITY, READING: ICD-10-CM

## 2024-09-05 DIAGNOSIS — Q44.2 CONGENITAL BILIARY ATRESIA: ICD-10-CM

## 2024-09-05 NOTE — PROGRESS NOTES
RUDDY NORIEGA received request to contact patient to provide support regarding Medical Assistance application and financial difficulties.      Call to patient regarding same.  Patient reports that she had been receiving Medical Assistance most of her life due to dx. And s/p liver transplant.  Patient reports that she did not renew the application when requested to do so and therefore her coverage was terminated.  Patient reports that she has completed and submitted a new application however she failed to submit the necessary financial information by the date required and therefore application was again not able to be renewed.  Patient is aware of the process necessary to renew the Medical Assistance application and plans to do so.  Patient also admits that her transplant medication co-pay is costly and Website Link to Tacrolimus Savings Card emailed to patient.     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  She has recently obtained her driving permit. Patient reports that she resides between her boyfriend's home and her Mother's home and states they are both supportive to her. Currently she is on her mother's commercial BC insurance plan. She denies further needs at this time.  Supportive counseling provided to patient.  Will close referral and remain available to provide support.

## 2024-09-06 ENCOUNTER — PATIENT OUTREACH (OUTPATIENT)
Dept: CASE MANAGEMENT | Facility: OTHER | Age: 24
End: 2024-09-06

## 2024-09-06 NOTE — PROGRESS NOTES
Received email message from patient requesting clarification regarding completion of the online application for Tacrolimus Savings Card.  Return email instructions sent to patient today.  Will continue to be available to provide support.

## 2024-09-10 ENCOUNTER — PATIENT MESSAGE (OUTPATIENT)
Dept: GASTROENTEROLOGY | Facility: CLINIC | Age: 24
End: 2024-09-10

## 2024-09-17 ENCOUNTER — TELEPHONE (OUTPATIENT)
Age: 24
End: 2024-09-17

## 2024-09-17 NOTE — TELEPHONE ENCOUNTER
LVM and MCM sent to pt to specify the accomidations she is looking to obtain in order to provide accurate documentation.

## 2024-10-31 ENCOUNTER — TELEPHONE (OUTPATIENT)
Age: 24
End: 2024-10-31

## 2024-10-31 NOTE — TELEPHONE ENCOUNTER
Called and Left another VM for patient regarding how patient will want office to sent letter signed by ABIGAIL Canas. Letter can be emailed, LVM for patient to call back to confirm if that ishow they want it sent to them and to confirm email address on file.

## 2024-10-31 NOTE — TELEPHONE ENCOUNTER
Called and LVM for patient to call back to in order to discuss how she want to received letter that was singed by Clary Canas. LVM for patient to call back to let us know. Letter can be emailed with patient approval.

## 2024-11-01 ENCOUNTER — PATIENT MESSAGE (OUTPATIENT)
Age: 24
End: 2024-11-01

## 2024-11-01 ENCOUNTER — TELEPHONE (OUTPATIENT)
Age: 24
End: 2024-11-01

## 2024-11-01 NOTE — TELEPHONE ENCOUNTER
Attempted to contact patient regarding scheduling a follow up with Dr. Dot FORBES for patient to call back to schedule.

## 2024-11-03 DIAGNOSIS — Z94.4 S/P LIVER TRANSPLANT (HCC): ICD-10-CM

## 2024-11-05 RX ORDER — TACROLIMUS 1 MG/1
1 CAPSULE ORAL EVERY 12 HOURS
Qty: 180 CAPSULE | Refills: 1 | Status: SHIPPED | OUTPATIENT
Start: 2024-11-05

## 2024-12-05 DIAGNOSIS — Z94.4 S/P LIVER TRANSPLANT (HCC): ICD-10-CM

## 2024-12-05 RX ORDER — TACROLIMUS 1 MG/1
1 CAPSULE ORAL EVERY 12 HOURS
Qty: 180 CAPSULE | Refills: 0 | Status: SHIPPED | OUTPATIENT
Start: 2024-12-05

## 2025-02-07 ENCOUNTER — OFFICE VISIT (OUTPATIENT)
Dept: INTERNAL MEDICINE CLINIC | Facility: OTHER | Age: 25
End: 2025-02-07
Payer: COMMERCIAL

## 2025-02-07 VITALS
DIASTOLIC BLOOD PRESSURE: 72 MMHG | OXYGEN SATURATION: 99 % | TEMPERATURE: 98.8 F | WEIGHT: 137 LBS | SYSTOLIC BLOOD PRESSURE: 108 MMHG | HEIGHT: 60 IN | BODY MASS INDEX: 26.9 KG/M2 | HEART RATE: 92 BPM

## 2025-02-07 DIAGNOSIS — D84.9 IMMUNOSUPPRESSED STATUS (HCC): ICD-10-CM

## 2025-02-07 DIAGNOSIS — Z94.4 HISTORY OF LIVER TRANSPLANT (HCC): ICD-10-CM

## 2025-02-07 DIAGNOSIS — F41.9 ANXIETY: Primary | ICD-10-CM

## 2025-02-07 PROBLEM — Z00.00 ANNUAL PHYSICAL EXAM: Status: RESOLVED | Noted: 2024-08-07 | Resolved: 2025-02-07

## 2025-02-07 PROBLEM — H92.02 LEFT EAR PAIN: Status: RESOLVED | Noted: 2024-06-20 | Resolved: 2025-02-07

## 2025-02-07 PROBLEM — F81.0 BASIC LEARNING DISABILITY, READING: Status: RESOLVED | Noted: 2018-12-10 | Resolved: 2025-02-07

## 2025-02-07 PROCEDURE — 99213 OFFICE O/P EST LOW 20 MIN: CPT | Performed by: NURSE PRACTITIONER

## 2025-02-07 RX ORDER — ESCITALOPRAM OXALATE 10 MG/1
10 TABLET ORAL DAILY
Qty: 90 TABLET | Refills: 1 | Status: SHIPPED | OUTPATIENT
Start: 2025-02-07

## 2025-02-07 NOTE — ASSESSMENT & PLAN NOTE
Increase Lexapro to 10 mg daily  Referral made for talk therapy and CBT  Months, sooner for any worsening symptoms  Orders:    escitalopram (LEXAPRO) 10 mg tablet; Take 1 tablet (10 mg total) by mouth daily    Ambulatory referral to Psych Services; Future

## 2025-02-07 NOTE — ASSESSMENT & PLAN NOTE
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

## 2025-02-07 NOTE — PROGRESS NOTES
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  Follows with Dr. Hanks with hepatology at [facility].  She is managed on tacrolimus for her liver transplant.   Follows with dermatology for her psoriasis.     Anxiety-she is managed on Lexapro 5 mg daily. 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 She feels overwhelmed with school. She was supposed to graduate last semester but is now looking at graduating next year. She denies any depression.   She is sleeping well.       Review of Systems   Constitutional:  Negative for activity change, appetite change, fever and unexpected weight change.   Psychiatric/Behavioral:  Negative for dysphoric mood and sleep disturbance. The patient is nervous/anxious.        Objective   /72 (BP Location: Left arm, Patient Position: Sitting, Cuff Size: Adult)   Pulse 92   Temp 98.8 °F (37.1 °C) (Temporal)   Ht 5' (1.524 m)   Wt 62.1 kg (137 lb)   SpO2 99% Comment: ra  BMI 26.76 kg/m²      Physical Exam  Vitals reviewed.   Constitutional:       General: She is not in  acute distress.     Appearance: Normal appearance. She is normal weight. She is not diaphoretic.   HENT:      Head: Normocephalic and atraumatic.   Eyes:      Extraocular Movements: Extraocular movements intact.      Conjunctiva/sclera: Conjunctivae normal.      Pupils: Pupils are equal, round, and reactive to light.   Cardiovascular:      Rate and Rhythm: Normal rate and regular rhythm.      Heart sounds: Normal heart sounds. No murmur heard.  Pulmonary:      Effort: Pulmonary effort is normal. No respiratory distress.      Breath sounds: Normal breath sounds. No wheezing, rhonchi or rales.   Neurological:      Mental Status: She is alert and oriented to person, place, and time. Mental status is at baseline.   Psychiatric:         Mood and Affect: Mood normal.         Behavior: Behavior normal.         Thought Content: Thought content normal.         Judgment: Judgment normal.

## 2025-02-26 DIAGNOSIS — Z94.4 S/P LIVER TRANSPLANT (HCC): ICD-10-CM

## 2025-02-27 RX ORDER — TACROLIMUS 1 MG/1
1 CAPSULE ORAL EVERY 12 HOURS
Qty: 180 CAPSULE | Refills: 1 | Status: SHIPPED | OUTPATIENT
Start: 2025-02-27

## 2025-04-13 DIAGNOSIS — Z94.4 S/P LIVER TRANSPLANT (HCC): ICD-10-CM

## 2025-04-14 RX ORDER — TACROLIMUS 1 MG/1
1 CAPSULE ORAL EVERY 12 HOURS
Qty: 180 CAPSULE | Refills: 0 | Status: SHIPPED | OUTPATIENT
Start: 2025-04-14

## 2025-04-15 ENCOUNTER — TELEPHONE (OUTPATIENT)
Age: 25
End: 2025-04-15

## 2025-04-15 NOTE — TELEPHONE ENCOUNTER
Patients GI provider:  Dr. Orantes    Number to return call: [phone]    Reason for call: Pt transferring her GI provider from Dr. Orantes in [place] to [facility]er in [place].     Scheduled procedure/appointment date if applicable: Apt  5/6/25

## 2025-04-15 NOTE — TELEPHONE ENCOUNTER
Spoke directly with Pt today via phone call @ [phone].  Pt informed that Pt has been scheduled for an appointment with ALEC Canas on 7/23/25 at the [address] [place] [address] office location.  Appointment letter mailed to address in chart per Pt's request.  Office phone number given to Pt should Pt need to reschedule appointment.

## 2025-04-17 ENCOUNTER — VBI (OUTPATIENT)
Dept: ADMINISTRATIVE | Facility: OTHER | Age: 25
End: 2025-04-17

## 2025-04-17 NOTE — TELEPHONE ENCOUNTER
04/17/25 11:36 AM     Chart reviewed for Pap Smear (HPV) aka Cervical Cancer Screening was/were submitted to the patient's insurance.     Michelle Dodge MA   [facility]

## 2025-05-03 ENCOUNTER — APPOINTMENT (OUTPATIENT)
Dept: LAB | Facility: HOSPITAL | Age: 25
End: 2025-05-03
Payer: COMMERCIAL

## 2025-05-03 DIAGNOSIS — F41.9 ANXIETY: ICD-10-CM

## 2025-05-03 DIAGNOSIS — Z94.4 S/P LIVER TRANSPLANT (HCC): ICD-10-CM

## 2025-05-03 LAB
ALBUMIN SERPL BCG-MCNC: 4.5 G/DL (ref 3.5–5)
ALP SERPL-CCNC: 92 U/L (ref 34–104)
ALT SERPL W P-5'-P-CCNC: 16 U/L (ref 7–52)
ANION GAP SERPL CALCULATED.3IONS-SCNC: 6 MMOL/L (ref 4–13)
AST SERPL W P-5'-P-CCNC: 19 U/L (ref 13–39)
BASOPHILS # BLD AUTO: 0.02 THOUSANDS/ÂΜL (ref 0–0.1)
BASOPHILS NFR BLD AUTO: 0 % (ref 0–1)
BILIRUB SERPL-MCNC: 1.79 MG/DL (ref 0.2–1)
BUN SERPL-MCNC: 11 MG/DL (ref 5–25)
CALCIUM SERPL-MCNC: 9.3 MG/DL (ref 8.4–10.2)
CHLORIDE SERPL-SCNC: 101 MMOL/L (ref 96–108)
CO2 SERPL-SCNC: 31 MMOL/L (ref 21–32)
CREAT SERPL-MCNC: 0.62 MG/DL (ref 0.6–1.3)
EOSINOPHIL # BLD AUTO: 0.28 THOUSAND/ÂΜL (ref 0–0.61)
EOSINOPHIL NFR BLD AUTO: 5 % (ref 0–6)
ERYTHROCYTE [DISTWIDTH] IN BLOOD BY AUTOMATED COUNT: 12.4 % (ref 11.6–15.1)
EST. AVERAGE GLUCOSE BLD GHB EST-MCNC: 80 MG/DL
GFR SERPL CREATININE-BSD FRML MDRD: 127 ML/MIN/1.73SQ M
GGT SERPL-CCNC: 10 U/L (ref 9–64)
GLUCOSE P FAST SERPL-MCNC: 87 MG/DL (ref 65–99)
HBA1C MFR BLD: 4.4 %
HCT VFR BLD AUTO: 40.9 % (ref 34.8–46.1)
HGB BLD-MCNC: 14.2 G/DL (ref 11.5–15.4)
IMM GRANULOCYTES # BLD AUTO: 0.01 THOUSAND/UL (ref 0–0.2)
IMM GRANULOCYTES NFR BLD AUTO: 0 % (ref 0–2)
INR PPP: 0.89 (ref 0.85–1.19)
LYMPHOCYTES # BLD AUTO: 1.85 THOUSANDS/ÂΜL (ref 0.6–4.47)
LYMPHOCYTES NFR BLD AUTO: 33 % (ref 14–44)
MCH RBC QN AUTO: 30.5 PG (ref 26.8–34.3)
MCHC RBC AUTO-ENTMCNC: 34.7 G/DL (ref 31.4–37.4)
MCV RBC AUTO: 88 FL (ref 82–98)
MONOCYTES # BLD AUTO: 0.43 THOUSAND/ÂΜL (ref 0.17–1.22)
MONOCYTES NFR BLD AUTO: 8 % (ref 4–12)
NEUTROPHILS # BLD AUTO: 3.11 THOUSANDS/ÂΜL (ref 1.85–7.62)
NEUTS SEG NFR BLD AUTO: 54 % (ref 43–75)
NRBC BLD AUTO-RTO: 0 /100 WBCS
PLATELET # BLD AUTO: 227 THOUSANDS/UL (ref 149–390)
PMV BLD AUTO: 9.6 FL (ref 8.9–12.7)
POTASSIUM SERPL-SCNC: 3.8 MMOL/L (ref 3.5–5.3)
PROT SERPL-MCNC: 7.8 G/DL (ref 6.4–8.4)
PROTHROMBIN TIME: 12.7 SECONDS (ref 12.3–15)
RBC # BLD AUTO: 4.66 MILLION/UL (ref 3.81–5.12)
SODIUM SERPL-SCNC: 138 MMOL/L (ref 135–147)
WBC # BLD AUTO: 5.7 THOUSAND/UL (ref 4.31–10.16)

## 2025-05-03 PROCEDURE — 85025 COMPLETE CBC W/AUTO DIFF WBC: CPT

## 2025-05-03 PROCEDURE — 36415 COLL VENOUS BLD VENIPUNCTURE: CPT

## 2025-05-03 PROCEDURE — 80197 ASSAY OF TACROLIMUS: CPT

## 2025-05-03 PROCEDURE — 80053 COMPREHEN METABOLIC PANEL: CPT

## 2025-05-03 PROCEDURE — 85610 PROTHROMBIN TIME: CPT

## 2025-05-03 PROCEDURE — 82977 ASSAY OF GGT: CPT

## 2025-05-03 PROCEDURE — 83036 HEMOGLOBIN GLYCOSYLATED A1C: CPT

## 2025-05-04 LAB — TACROLIMUS BLD-MCNC: 3.7 NG/ML (ref 3–15)

## 2025-05-06 ENCOUNTER — TELEPHONE (OUTPATIENT)
Age: 25
End: 2025-05-06

## 2025-05-06 ENCOUNTER — PREP FOR PROCEDURE (OUTPATIENT)
Age: 25
End: 2025-05-06

## 2025-05-06 ENCOUNTER — OFFICE VISIT (OUTPATIENT)
Age: 25
End: 2025-05-06
Payer: COMMERCIAL

## 2025-05-06 VITALS
HEIGHT: 60 IN | SYSTOLIC BLOOD PRESSURE: 114 MMHG | WEIGHT: 141 LBS | OXYGEN SATURATION: 99 % | DIASTOLIC BLOOD PRESSURE: 80 MMHG | BODY MASS INDEX: 27.68 KG/M2 | HEART RATE: 85 BPM

## 2025-05-06 DIAGNOSIS — R10.84 GENERALIZED ABDOMINAL PAIN: Primary | ICD-10-CM

## 2025-05-06 DIAGNOSIS — R19.7 DIARRHEA, UNSPECIFIED TYPE: ICD-10-CM

## 2025-05-06 PROCEDURE — 99214 OFFICE O/P EST MOD 30 MIN: CPT | Performed by: PHYSICIAN ASSISTANT

## 2025-05-06 RX ORDER — SODIUM CHLORIDE, SODIUM LACTATE, POTASSIUM CHLORIDE, CALCIUM CHLORIDE 600; 310; 30; 20 MG/100ML; MG/100ML; MG/100ML; MG/100ML
125 INJECTION, SOLUTION INTRAVENOUS CONTINUOUS
OUTPATIENT
Start: 2025-05-06

## 2025-05-06 NOTE — TELEPHONE ENCOUNTER
Good Afternoon! Ptn saw Danni Hauser and she ordered a colonoscopy with Dr. Teague. Ptn was given the Miralax prep. Please call to schedule. Thanks!   needs

## 2025-05-06 NOTE — PROGRESS NOTES
Name: Domonique Rivers      : 2000      MRN: [id number]  Encounter Provider: Michelle Hauser PA-C  Encounter Date: 2025   Encounter department: [facility] [place]  :  Assessment & Plan  Generalized abdominal pain    Diarrhea, unspecified type    Patient reports a long history of intermittent episodes of generalized abdominal pain.    She also has intermittent diarrhea.  CT Scan A/P with contrast 2023 suggested mild gastric wall thickening. She has had a cholecystectomy.  EGD 2024 was normal and biopsies were negative for h pylori and celiac disease.  Gastric emptying study 3/2024 showed a normal rate of gastric emptying.  No relief with a PPI or Bentyl in the past.    Will plan for colonoscopy with visualization of the terminal ileum to investigate - rule out crohn's/IBD, etc.  Trial of a low FODMAP diet (information sheet provided to patient).        History of Present Illness   Abdominal Pain      Domonique Rivers is a 24 y.o. female with a PMH of biliary atresia s/p OLT x 2 ( and ) on Tacrolimus who presents to our office for an evaluation of abdominal pain. Patient reports of a long history of episodes of intermittent generalized abdominal pain.  She also has a history of intermittent diarrhea and nausea.  She has tried PPIs and Bentyl without relief in the past.  No family history of colon cancer, crohn's disease, or UC. She has never had a colonoscopy.  Other GI testing for her symptoms as noted above.  She folllows with our Hepatology team for her liver transplant status and has a follow up in July.    I discussed informed consent with the patient for the colonoscopy. The risks/benefits of the procedure were discussed with the patient. Risks included, but not limited to, infection, bleeding, perforation were discussed. Patient was agreeable.   History obtained from: patient      Medical History Reviewed by provider this encounter:     .  Past  Medical History   Past Medical History:   Diagnosis Date    Anxiety     Basic learning disability, reading 12/10/2018    College student; may have trouble with math word problems.      Biliary atresia     Migraines     Portal vein thrombosis      Past Surgical History:   Procedure Laterality Date    CHOLECYSTECTOMY      removed with transplant    HEPATITIS B SURFACE AB QN,LIVER TRANSPLANT (HISTORICAL)      LIVER SURGERY      transplant     Family History   Problem Relation Age of Onset    Depression Mother     Depression Brother       reports that she has never smoked. She has never used smokeless tobacco. She reports that she does not currently use alcohol. She reports that she does not use drugs.  Current Outpatient Medications   Medication Instructions    calcipotriene (DOVONEX) 0.005 % topical solution Apply 1-2 drops to psoriasis areas nightly as needed.    clobetasol (TEMOVATE) 0.05 % ointment apply A THN LAYER TO PINK SCALY PATCHES TWICE DAILY FOR 2 TO 7 DA...  (REFER TO PRESCRIPTION NOTES).    escitalopram (LEXAPRO) 10 mg, Oral, Daily    ondansetron (ZOFRAN-ODT) 4 mg, Oral, Every 8 hours PRN    tacrolimus (PROGRAF) 1 mg, Oral, Every 12 hours   No Known Allergies   Current Outpatient Medications on File Prior to Visit   Medication Sig Dispense Refill    calcipotriene (DOVONEX) 0.005 % topical solution Apply 1-2 drops to psoriasis areas nightly as needed.      clobetasol (TEMOVATE) 0.05 % ointment apply A THN LAYER TO PINK SCALY PATCHES TWICE DAILY FOR 2 TO 7 DA...  (REFER TO PRESCRIPTION NOTES).      escitalopram (LEXAPRO) 10 mg tablet Take 1 tablet (10 mg total) by mouth daily 90 tablet 1    tacrolimus (PROGRAF) 1 mg capsule Take 1 capsule (1 mg total) by mouth every 12 (twelve) hours 180 capsule 0    ondansetron (ZOFRAN-ODT) 4 mg disintegrating tablet Take 1 tablet (4 mg total) by mouth every 8 (eight) hours as needed for nausea (Patient not taking: Reported on 5/6/2025) 20 tablet 0     No current  facility-administered medications on file prior to visit.      Social History     Tobacco Use    Smoking status: Never    Smokeless tobacco: Never   Vaping Use    Vaping status: Never Used   Substance and Sexual Activity    Alcohol use: Not Currently     Comment: drink occasionally. When I do drink it's usually one drink.    Drug use: Never    Sexual activity: Yes     Partners: Male     Birth control/protection: Condom Male        Objective   /80   Pulse 85   Ht 5' (1.524 m)   Wt 64 kg (141 lb)   SpO2 99%   BMI 27.54 kg/m²      Physical Exam  Constitutional:       General: She is not in acute distress.     Appearance: She is well-developed. She is not ill-appearing.   HENT:      Head: Normocephalic and atraumatic.   Cardiovascular:      Rate and Rhythm: Normal rate and regular rhythm.   Pulmonary:      Effort: Pulmonary effort is normal. No respiratory distress.      Breath sounds: Normal breath sounds.   Skin:     General: Skin is warm and dry.   Neurological:      Mental Status: She is alert and oriented to person, place, and time.

## 2025-05-07 NOTE — TELEPHONE ENCOUNTER
Called and schedule with patient - reviewed prep/she received a copy at OV    Scheduled date of colonoscopy (as of today):7/14/25  Physician performing colonoscopy:Ho  Location of colonoscopy:[place]  Bowel prep reviewed with patient:Dulco/Miralax  Instructions reviewed with patient by:Richar robert  Clearances: none

## 2025-06-11 ENCOUNTER — RESULTS FOLLOW-UP (OUTPATIENT)
Age: 25
End: 2025-06-11

## 2025-07-14 ENCOUNTER — HOSPITAL ENCOUNTER (OUTPATIENT)
Dept: GASTROENTEROLOGY | Facility: HOSPITAL | Age: 25
Setting detail: OUTPATIENT SURGERY
Discharge: HOME | End: 2025-07-14
Attending: PHYSICIAN ASSISTANT
Payer: COMMERCIAL

## 2025-07-14 ENCOUNTER — ANESTHESIA EVENT (OUTPATIENT)
Dept: GASTROENTEROLOGY | Facility: HOSPITAL | Age: 25
End: 2025-07-14
Payer: COMMERCIAL

## 2025-07-14 ENCOUNTER — ANESTHESIA (OUTPATIENT)
Dept: GASTROENTEROLOGY | Facility: HOSPITAL | Age: 25
End: 2025-07-14
Payer: COMMERCIAL

## 2025-07-14 VITALS
HEART RATE: 70 BPM | BODY MASS INDEX: 28.61 KG/M2 | WEIGHT: 145.72 LBS | SYSTOLIC BLOOD PRESSURE: 99 MMHG | RESPIRATION RATE: 31 BRPM | OXYGEN SATURATION: 97 % | HEIGHT: 60 IN | TEMPERATURE: 97 F | DIASTOLIC BLOOD PRESSURE: 56 MMHG

## 2025-07-14 DIAGNOSIS — R10.84 GENERALIZED ABDOMINAL PAIN: ICD-10-CM

## 2025-07-14 DIAGNOSIS — R19.7 DIARRHEA, UNSPECIFIED TYPE: ICD-10-CM

## 2025-07-14 LAB
EXT PREGNANCY TEST URINE: NEGATIVE
EXT. CONTROL: NORMAL

## 2025-07-14 PROCEDURE — 81025 URINE PREGNANCY TEST: CPT | Performed by: STUDENT IN AN ORGANIZED HEALTH CARE EDUCATION/TRAINING PROGRAM

## 2025-07-14 PROCEDURE — 88305 TISSUE EXAM BY PATHOLOGIST: CPT | Performed by: PATHOLOGY

## 2025-07-14 PROCEDURE — 45380 COLONOSCOPY AND BIOPSY: CPT | Performed by: INTERNAL MEDICINE

## 2025-07-14 RX ORDER — LIDOCAINE HYDROCHLORIDE 20 MG/ML
INJECTION, SOLUTION EPIDURAL; INFILTRATION; INTRACAUDAL; PERINEURAL AS NEEDED
Status: DISCONTINUED | OUTPATIENT
Start: 2025-07-14 | End: 2025-07-14

## 2025-07-14 RX ORDER — SODIUM CHLORIDE, SODIUM LACTATE, POTASSIUM CHLORIDE, CALCIUM CHLORIDE 600; 310; 30; 20 MG/100ML; MG/100ML; MG/100ML; MG/100ML
20 INJECTION, SOLUTION INTRAVENOUS CONTINUOUS
Status: DISCONTINUED | OUTPATIENT
Start: 2025-07-14 | End: 2025-07-18 | Stop reason: HOSPADM

## 2025-07-14 RX ORDER — PROPOFOL 10 MG/ML
INJECTION, EMULSION INTRAVENOUS AS NEEDED
Status: DISCONTINUED | OUTPATIENT
Start: 2025-07-14 | End: 2025-07-14

## 2025-07-14 RX ADMIN — PROPOFOL 50 MG: 10 INJECTION, EMULSION INTRAVENOUS at 11:54

## 2025-07-14 RX ADMIN — PROPOFOL 50 MG: 10 INJECTION, EMULSION INTRAVENOUS at 11:51

## 2025-07-14 RX ADMIN — PROPOFOL 50 MG: 10 INJECTION, EMULSION INTRAVENOUS at 11:39

## 2025-07-14 RX ADMIN — PROPOFOL 50 MG: 10 INJECTION, EMULSION INTRAVENOUS at 11:46

## 2025-07-14 RX ADMIN — PROPOFOL 50 MG: 10 INJECTION, EMULSION INTRAVENOUS at 11:42

## 2025-07-14 RX ADMIN — PROPOFOL 150 MG: 10 INJECTION, EMULSION INTRAVENOUS at 11:37

## 2025-07-14 RX ADMIN — SODIUM CHLORIDE, SODIUM LACTATE, POTASSIUM CHLORIDE, AND CALCIUM CHLORIDE: .6; .31; .03; .02 INJECTION, SOLUTION INTRAVENOUS at 11:30

## 2025-07-14 RX ADMIN — LIDOCAINE HYDROCHLORIDE 80 MG: 20 INJECTION, SOLUTION EPIDURAL; INFILTRATION; INTRACAUDAL; PERINEURAL at 11:37

## 2025-07-14 NOTE — H&P
History and Physical -  [facility]  Domonique Rivers 24 y.o. female MRN: [id number]      HPI: Domonique Rivers is a 24 y.o. year old female who presents for evaluation of abdominal pain and diarrhea      REVIEW OF SYSTEMS: Per the HPI, and otherwise unremarkable.    Historical Information   Past Medical History[1]  Past Surgical History[2]  Social History   Social History     Substance and Sexual Activity   Alcohol Use Not Currently    Comment: drink occasionally. When I do drink it's usually one drink.     Social History     Substance and Sexual Activity   Drug Use Never     Tobacco Use History[3]  Family History[4]    Meds/Allergies     Not in a hospital admission.    Allergies[5]    Objective     Blood pressure 120/69, pulse 98, temperature 97.8 °F (36.6 °C), temperature source Temporal, resp. rate 18, height 5' (1.524 m), weight 66.1 kg (145 lb 11.6 oz), last menstrual period 06/26/2025, SpO2 99%.      PHYSICAL EXAM    Gen: NAD  CV: RRR  CHEST: Clear  ABD: soft, NT/ND  EXT: no edema      ASSESSMENT/PLAN:  This is a 24 y.o. year old female here for colonoscopy with biopsies, and she is stable and optimized for her procedure.               [1]   Past Medical History:  Diagnosis Date    Anxiety     Basic learning disability, reading 12/10/2018    College student; may have trouble with math word problems.      Biliary atresia     Migraines     Portal vein thrombosis    [2]   Past Surgical History:  Procedure Laterality Date    CHOLECYSTECTOMY      removed with transplant    HEPATITIS B SURFACE AB QN,LIVER TRANSPLANT (HISTORICAL)      LIVER SURGERY      transplant   [3]   Social History  Tobacco Use   Smoking Status Never   Smokeless Tobacco Never   [4]   Family History  Problem Relation Name Age of Onset    Depression Mother Nataly     Depression Brother Ji Rivers    [5] No Known Allergies

## 2025-07-14 NOTE — ANESTHESIA PREPROCEDURE EVALUATION
Procedure:  COLONOSCOPY    Relevant Problems   ANESTHESIA (within normal limits)      CARDIO (within normal limits)   (+) Migraine without aura and without status migrainosus, not intractable      ENDO (within normal limits)      GI/HEPATIC  Confirmed NPO appropriate  S/p bowel prep      /RENAL (within normal limits)      GYN   (-) Currently pregnant      HEMATOLOGY (within normal limits)      MUSCULOSKELETAL (within normal limits)      NEURO/PSYCH   (+) Anxiety   (+) Migraine without aura and without status migrainosus, not intractable      PULMONARY (within normal limits)   (-) URI (upper respiratory infection)      Other Pediatrics   (+) Congenital biliary atresia      Surgery/Wound/Pain   (+) History of liver transplant (HCC) (2002 and 2005)      Other   (+) Immunosuppressed status (HCC)   (+) Seasonal allergies        Physical Exam    Airway     Mallampati score: II  TM Distance: >3 FB  Neck ROM: full      Cardiovascular  Rhythm: regular, Rate: normal    Dental   No notable dental hx     Pulmonary   Breath sounds clear to auscultation    Neurological    She appears awake and alert.      Other Findings  post-pubertal.      Anesthesia Plan  ASA Score- 3     Anesthesia Type- IV sedation with anesthesia with ASA Monitors.         Additional Monitors:     Airway Plan: natural airway.           Plan Factors-Exercise tolerance (METS): >4 METS.    Chart reviewed.        Patient is not a current smoker.              Induction- intravenous.    Postoperative Plan- .   Monitoring Plan - Monitoring plan - standard ASA monitoring  Post Operative Pain Plan - non-opiod analgesics        Informed Consent- Anesthetic plan and risks discussed with patient.  I personally reviewed this patient with the CRNA. Discussed and agreed on the Anesthesia Plan with the CRNA..      NPO Status:  Vitals Value Taken Time   Date of last liquid 07/13/25 07/14/25 10:19   Time of last liquid 2200 07/14/25 10:19   Date of last solid 07/12/25  07/14/25 10:19   Time of last solid 1800 07/14/25 10:19

## 2025-07-14 NOTE — ANESTHESIA POSTPROCEDURE EVALUATION
Post-Op Assessment Note    CV Status:  Stable    Pain management: adequate       Mental Status:  Alert and awake   Hydration Status:  Euvolemic   PONV Controlled:  Controlled   Airway Patency:  Patent  Two or more mitigation strategies used for obstructive sleep apnea   Post Op Vitals Reviewed: Yes    No anethesia notable event occurred.    Staff: Anesthesiologist, CRNA           Last Filed PACU Vitals:  Vitals Value Taken Time   Temp 97 °F (36.1 °C) 07/14/25 12:00   Pulse 70 07/14/25 12:00   BP 97/53 07/14/25 12:00   Resp 18 07/14/25 12:00   SpO2 95 % 07/14/25 12:00       Modified Iftikhar:     Vitals Value Taken Time   Activity 2 07/14/25 12:00   Respiration 2 07/14/25 12:00   Circulation 2 07/14/25 12:00   Consciousness 1 07/14/25 12:00   Oxygen Saturation 2 07/14/25 12:00     Modified Iftikhar Score: 9

## 2025-07-18 PROCEDURE — 88305 TISSUE EXAM BY PATHOLOGIST: CPT | Performed by: PATHOLOGY

## 2025-07-23 ENCOUNTER — TELEPHONE (OUTPATIENT)
Age: 25
End: 2025-07-23

## 2025-07-23 NOTE — TELEPHONE ENCOUNTER
Pt informed PEP she forgot she had an OV scheduled today with ALEC Nguyen and missed the appointment. No need to reschedule as pt has an office visit already scheduled 08/06 with the same provider.

## 2025-07-28 DIAGNOSIS — Z94.4 S/P LIVER TRANSPLANT (HCC): ICD-10-CM

## 2025-07-30 RX ORDER — TACROLIMUS 1 MG/1
1 CAPSULE ORAL EVERY 12 HOURS
Qty: 180 CAPSULE | Refills: 0 | Status: SHIPPED | OUTPATIENT
Start: 2025-07-30 | End: 2025-08-06 | Stop reason: SDUPTHER

## 2025-08-06 ENCOUNTER — OFFICE VISIT (OUTPATIENT)
Age: 25
End: 2025-08-06

## 2025-08-06 VITALS
HEART RATE: 72 BPM | DIASTOLIC BLOOD PRESSURE: 80 MMHG | HEIGHT: 60 IN | BODY MASS INDEX: 29.09 KG/M2 | SYSTOLIC BLOOD PRESSURE: 114 MMHG | WEIGHT: 148.2 LBS

## 2025-08-06 DIAGNOSIS — Z94.4 S/P LIVER TRANSPLANT (HCC): ICD-10-CM

## 2025-08-06 DIAGNOSIS — R10.84 GENERALIZED POSTPRANDIAL ABDOMINAL PAIN: ICD-10-CM

## 2025-08-06 DIAGNOSIS — Z87.738 HISTORY OF BILIARY ATRESIA: Primary | ICD-10-CM

## 2025-08-06 DIAGNOSIS — D84.9 IMMUNOSUPPRESSED STATUS (HCC): ICD-10-CM

## 2025-08-06 RX ORDER — TACROLIMUS 1 MG/1
1 CAPSULE ORAL EVERY 12 HOURS
Qty: 180 CAPSULE | Refills: 3 | Status: SHIPPED | OUTPATIENT
Start: 2025-08-06

## 2025-08-20 ENCOUNTER — TELEPHONE (OUTPATIENT)
Age: 25
End: 2025-08-20